# Patient Record
Sex: MALE | Race: WHITE
[De-identification: names, ages, dates, MRNs, and addresses within clinical notes are randomized per-mention and may not be internally consistent; named-entity substitution may affect disease eponyms.]

---

## 2017-03-09 ENCOUNTER — HOSPITAL ENCOUNTER (INPATIENT)
Dept: HOSPITAL 25 - ED | Age: 82
LOS: 2 days | Discharge: HOME | DRG: 249 | End: 2017-03-11
Attending: HOSPITALIST | Admitting: HOSPITALIST
Payer: COMMERCIAL

## 2017-03-09 DIAGNOSIS — R65.10: ICD-10-CM

## 2017-03-09 DIAGNOSIS — Z79.899: ICD-10-CM

## 2017-03-09 DIAGNOSIS — F03.90: ICD-10-CM

## 2017-03-09 DIAGNOSIS — A08.4: Primary | ICD-10-CM

## 2017-03-09 DIAGNOSIS — Z98.2: ICD-10-CM

## 2017-03-09 DIAGNOSIS — Z88.8: ICD-10-CM

## 2017-03-09 DIAGNOSIS — I11.9: ICD-10-CM

## 2017-03-09 DIAGNOSIS — Z95.1: ICD-10-CM

## 2017-03-09 DIAGNOSIS — E86.0: ICD-10-CM

## 2017-03-09 DIAGNOSIS — Z87.891: ICD-10-CM

## 2017-03-09 DIAGNOSIS — Z87.820: ICD-10-CM

## 2017-03-09 DIAGNOSIS — H54.7: ICD-10-CM

## 2017-03-09 DIAGNOSIS — E87.2: ICD-10-CM

## 2017-03-09 DIAGNOSIS — K21.9: ICD-10-CM

## 2017-03-09 DIAGNOSIS — N40.0: ICD-10-CM

## 2017-03-09 DIAGNOSIS — I25.810: ICD-10-CM

## 2017-03-09 DIAGNOSIS — E78.5: ICD-10-CM

## 2017-03-09 LAB
ALBUMIN SERPL BCG-MCNC: 3.9 G/DL (ref 3.2–5.2)
ALP SERPL-CCNC: 78 U/L (ref 34–104)
ALT SERPL W P-5'-P-CCNC: 12 U/L (ref 7–52)
ANION GAP SERPL CALC-SCNC: 12 MMOL/L (ref 2–11)
AST SERPL-CCNC: 12 U/L (ref 13–39)
BUN SERPL-MCNC: 26 MG/DL (ref 6–24)
BUN/CREAT SERPL: 31.3 (ref 8–20)
CALCIUM SERPL-MCNC: 9.2 MG/DL (ref 8.6–10.3)
CHLORIDE SERPL-SCNC: 102 MMOL/L (ref 101–111)
GLOBULIN SER CALC-MCNC: 2.5 G/DL (ref 2–4)
GLUCOSE SERPL-MCNC: 175 MG/DL (ref 70–100)
HCO3 SERPL-SCNC: 24 MMOL/L (ref 22–32)
HCT VFR BLD AUTO: 49 % (ref 42–52)
HGB BLD-MCNC: 16.1 G/DL (ref 14–18)
MCH RBC QN AUTO: 29 PG (ref 27–31)
MCHC RBC AUTO-ENTMCNC: 33 G/DL (ref 31–36)
MCV RBC AUTO: 86 FL (ref 80–94)
POTASSIUM SERPL-SCNC: 3.5 MMOL/L (ref 3.5–5)
PROT SERPL-MCNC: 6.4 G/DL (ref 6.4–8.9)
RBC # BLD AUTO: 5.62 10^6/UL (ref 4–5.4)
SODIUM SERPL-SCNC: 138 MMOL/L (ref 133–145)
TROPONIN I SERPL-MCNC: 0.01 NG/ML (ref ?–0.04)
WBC # BLD AUTO: 11.8 10^3/UL (ref 3.5–10.8)

## 2017-03-09 PROCEDURE — 70450 CT HEAD/BRAIN W/O DYE: CPT

## 2017-03-09 PROCEDURE — 81003 URINALYSIS AUTO W/O SCOPE: CPT

## 2017-03-09 PROCEDURE — 80048 BASIC METABOLIC PNL TOTAL CA: CPT

## 2017-03-09 PROCEDURE — 71010: CPT

## 2017-03-09 PROCEDURE — 87040 BLOOD CULTURE FOR BACTERIA: CPT

## 2017-03-09 PROCEDURE — 85025 COMPLETE CBC W/AUTO DIFF WBC: CPT

## 2017-03-09 PROCEDURE — 83605 ASSAY OF LACTIC ACID: CPT

## 2017-03-09 PROCEDURE — 36415 COLL VENOUS BLD VENIPUNCTURE: CPT

## 2017-03-09 PROCEDURE — 80053 COMPREHEN METABOLIC PANEL: CPT

## 2017-03-09 PROCEDURE — 87502 INFLUENZA DNA AMP PROBE: CPT

## 2017-03-09 PROCEDURE — 94760 N-INVAS EAR/PLS OXIMETRY 1: CPT

## 2017-03-09 PROCEDURE — 87641 MR-STAPH DNA AMP PROBE: CPT

## 2017-03-09 PROCEDURE — 84484 ASSAY OF TROPONIN QUANT: CPT

## 2017-03-09 NOTE — RAD
INDICATION: Fever     



COMPARISON: December 12, 2016

 

TECHNIQUE: An AP portable view obtained at 2009 hours is submitted.



FINDINGS: 



Bones/Soft Tissues: There are no acute bony findings. There is sternotomy



Cardiomediastinal: The cardiomediastinal silhouette is normal. 



Lungs: There are no infiltrates.



Pleura: There are no pleural effusions. 



Other: None



IMPRESSION:  NO ACTIVE DISEASE.

## 2017-03-09 NOTE — HP
H&P (Free Text)


History and Physical: 





PCP: NURYS Leone MD





Date/Time of Evaluation: 03/09/2017 2310





CC: fever, N/V/D <24h





HPI: Mr Dai is an 81YO male resident of [facility] who presents with 

report of N/V/D x <24hours with temperature up to 100F. He has been more 

fatigued than typical. There has been no report of pain, cough, SOB, or other 

issues. Mr Dai is non-verbal, but recognizes his wife at the bedside with whom 

he is very affectionate, reaching up and gently pulling her to him to kiss.





Work up is notable for being SIRS positive initially with tachycardia & 

tachypnea. Labs & vitals are stable. No source is identified. He has been noted 

to have decreased PO intake in general, worse over the past few days and so 

dehydration may be the issue.





PMedHx


CAD/6vCABG


ICH


dementia


HTN


HLD


GERD


TBI


BPH





Allergies


Haloperidol [From Haldol] Allergy (Verified 04/09/15 10:56)


 Unknown Reaction Details





Ambulatory Orders


Nursing to reconcile.





PSurgHx


6vCABG


 shunt


R TKA


tonsillectomy





SocHx: former smoker quit ~20 years ago, no alcohol or recreational drugs; 

resides at [facility] [place]; full code status





FamHx: unobtainable





ROS: as above, otherwise reviewed and all were negative





Constitutional: NAD, normally developed, obese white male


 


vitals: 


 Vital Signs











Temp  37.2 C   03/09/17 23:59


 


Pulse  97   03/09/17 23:59


 


Resp  20   03/09/17 23:59


 


BP  130/67   03/09/17 23:59


 


Pulse Ox  93   03/09/17 23:59








 Intake & Output











 03/09/17 03/09/17 03/10/17





 11:59 23:59 11:59


 


Weight  108.862 kg 








HEENM: atraumatic; sclera/conjunctiva: non-icteric/clear; hearing: unable to 

adequately assess; oropharynx: clear, mucosa tacky





Neck: soft tissue: no nuchal rigidity; thyroid: normal 





Pulmonary: clear to auscultation bilaterally, good aeration, no accessory 

muscle use





CV: RR/RR, normal S1S2, no carotid bruit, no jugular venous distention, 2+ B DP/

PT, no edema





Abdominal: soft, non-distended, non-tender, no rebound/guarding/rigidity, 

normoactive bowel sounds, no hepatosplenomegaly or masses, no costovertebral 

angle tenderness





Musculoskeletal: general: grossly intact; gait: minimally ambulatory, uses 

walker





Integumental: normal appearance & texture of exposed skin





Psychiatric 


orientation: AA&O to person only


affect: calm


mood: cooperative


eye contact: fair


content: non-verbal


insight: poor





Testing: 


 Lab Results











  03/09/17 03/09/17 03/09/17 Range/Units





  21:10 21:10 21:10 


 


WBC  11.8 H    (3.5-10.8)  10^3/ul


 


RBC  5.62 H    (4.0-5.4)  10^6/ul


 


Hgb  16.1    (14.0-18.0)  g/dl


 


Hct  49    (42-52)  %


 


MCV  86    (80-94)  fL


 


MCH  29    (27-31)  pg


 


MCHC  33    (31-36)  g/dl


 


RDW  13    (10.5-15)  %


 


Plt Count  187    (150-450)  10^3/ul


 


MPV  8    (7.4-10.4)  um3


 


Neut % (Auto)  88.5 H    (38-83)  %


 


Lymph % (Auto)  7.4 L    (25-47)  %


 


Mono % (Auto)  3.6    (1-9)  %


 


Eos % (Auto)  0.2    (0-6)  %


 


Baso % (Auto)  0.3    (0-2)  %


 


Absolute Neuts (auto)  10.4 H    (1.5-7.7)  10^3/ul


 


Absolute Lymphs (auto)  0.9 L    (1.0-4.8)  10^3/ul


 


Absolute Monos (auto)  0.4    (0-0.8)  10^3/ul


 


Absolute Eos (auto)  0    (0-0.6)  10^3/ul


 


Absolute Basos (auto)  0    (0-0.2)  10^3/ul


 


Absolute Nucleated RBC  0    10^3/ul


 


Nucleated RBC %  0    


 


Sodium   138   (133-145)  mmol/L


 


Potassium   3.5   (3.5-5.0)  mmol/L


 


Chloride   102   (101-111)  mmol/L


 


Carbon Dioxide   24   (22-32)  mmol/L


 


Anion Gap   12 H   (2-11)  mmol/L


 


BUN   26 H   (6-24)  mg/dL


 


Creatinine   0.83   (0.67-1.17)  mg/dL


 


Est GFR ( Amer)   114.4   (>60)  


 


Est GFR (Non-Af Amer)   88.9   (>60)  


 


BUN/Creatinine Ratio   31.3 H   (8-20)  


 


Glucose   175 H   ()  mg/dL


 


Lactic Acid    2.7 H*  (0.5-2.0)  mmol/L


 


Calcium   9.2   (8.6-10.3)  mg/dL


 


Total Bilirubin   0.90   (0.2-1.0)  mg/dL


 


AST   12 L   (13-39)  U/L


 


ALT   12   (7-52)  U/L


 


Alkaline Phosphatase   78   ()  U/L


 


Troponin I   0.01   (<0.04)  ng/mL


 


Total Protein   6.4   (6.4-8.9)  g/dL


 


Albumin   3.9   (3.2-5.2)  g/dL


 


Globulin   2.5   (2-4)  g/dL


 


Albumin/Globulin Ratio   1.6   (1-3)  


 


Urine Color     


 


Urine Appearance     


 


Urine pH     (5-9)  


 


Ur Specific Gravity     (1.010-1.030)  


 


Urine Protein     (Negative)  


 


Urine Ketones     (Negative)  


 


Urine Blood     (Negative)  


 


Urine Nitrate     (Negative)  


 


Urine Bilirubin     (Negative)  


 


Urine Urobilinogen     (Negative)  


 


Ur Leukocyte Esterase     (Negative)  


 


Urine Glucose     (Negative)  


 


Urine Ascorbic Acid     (Negative)  


 


Influenza A (Rapid)     (Negative)  


 


Influenza B (Rapid)     (Negative)  














  03/09/17 03/09/17 Range/Units





  21:33 23:13 


 


WBC    (3.5-10.8)  10^3/ul


 


RBC    (4.0-5.4)  10^6/ul


 


Hgb    (14.0-18.0)  g/dl


 


Hct    (42-52)  %


 


MCV    (80-94)  fL


 


MCH    (27-31)  pg


 


MCHC    (31-36)  g/dl


 


RDW    (10.5-15)  %


 


Plt Count    (150-450)  10^3/ul


 


MPV    (7.4-10.4)  um3


 


Neut % (Auto)    (38-83)  %


 


Lymph % (Auto)    (25-47)  %


 


Mono % (Auto)    (1-9)  %


 


Eos % (Auto)    (0-6)  %


 


Baso % (Auto)    (0-2)  %


 


Absolute Neuts (auto)    (1.5-7.7)  10^3/ul


 


Absolute Lymphs (auto)    (1.0-4.8)  10^3/ul


 


Absolute Monos (auto)    (0-0.8)  10^3/ul


 


Absolute Eos (auto)    (0-0.6)  10^3/ul


 


Absolute Basos (auto)    (0-0.2)  10^3/ul


 


Absolute Nucleated RBC    10^3/ul


 


Nucleated RBC %    


 


Sodium    (133-145)  mmol/L


 


Potassium    (3.5-5.0)  mmol/L


 


Chloride    (101-111)  mmol/L


 


Carbon Dioxide    (22-32)  mmol/L


 


Anion Gap    (2-11)  mmol/L


 


BUN    (6-24)  mg/dL


 


Creatinine    (0.67-1.17)  mg/dL


 


Est GFR ( Amer)    (>60)  


 


Est GFR (Non-Af Amer)    (>60)  


 


BUN/Creatinine Ratio    (8-20)  


 


Glucose    ()  mg/dL


 


Lactic Acid    (0.5-2.0)  mmol/L


 


Calcium    (8.6-10.3)  mg/dL


 


Total Bilirubin    (0.2-1.0)  mg/dL


 


AST    (13-39)  U/L


 


ALT    (7-52)  U/L


 


Alkaline Phosphatase    ()  U/L


 


Troponin I    (<0.04)  ng/mL


 


Total Protein    (6.4-8.9)  g/dL


 


Albumin    (3.2-5.2)  g/dL


 


Globulin    (2-4)  g/dL


 


Albumin/Globulin Ratio    (1-3)  


 


Urine Color  Yellow   


 


Urine Appearance  Cloudy   


 


Urine pH  5.0   (5-9)  


 


Ur Specific Gravity  1.027   (1.010-1.030)  


 


Urine Protein  Negative   (Negative)  


 


Urine Ketones  Trace H   (Negative)  


 


Urine Blood  Negative   (Negative)  


 


Urine Nitrate  Negative   (Negative)  


 


Urine Bilirubin  Negative   (Negative)  


 


Urine Urobilinogen  Negative   (Negative)  


 


Ur Leukocyte Esterase  Negative   (Negative)  


 


Urine Glucose  Negative   (Negative)  


 


Urine Ascorbic Acid  * H   (Negative)  


 


Influenza A (Rapid)   Negative  (Negative)  


 


Influenza B (Rapid)   Negative  (Negative)  








CXR, personally reviewed: IMPRESSION:  NO ACTIVE DISEASE.





Impression: 82M presenting with probable viral gastroenteritis and SIRS 2nd 

dehydration





DIAGNOSIS & PLAN


Primary 


SIRS 2nd dehydration & suspected viral gastroenteritis


: IVFs


: trend labs


: supportive care





Secondary 


CAD/6vCABG


: review meds once reconciled





ICH


: no anticoagulation for DVT prophylaxis





dementia


: review meds once reconciled





HTN


: review meds once reconciled


: PRN IV hydralazine for systolics >175





HLD


: review meds once reconciled





GERD


: omeprazole





BPH


: review meds once reconciled





Admission Rational: observation for IVFs & dehydration


DVTp: SCDs


Code Status: full


HCP: wife

## 2017-03-09 NOTE — ED
Dani ARZOLA Adam, scribed for Ry Brand on 03/09/17 at 1959 .





GI/ HPI





- HPI Summary


HPI Summary: 


Pt is an 82 year old male presenting with vomiting and diarrhea. He has 

dementia and is completely nonverbal during exam; hx was obtained from pt's 

wife. She states that the staff at [facility] report vomiting 

and diarrhea and have been unable to get the pt to eat or drink anything. They 

also report that the pt has a fever. In addition to dementia, PMHx includes 

subarachnoid hemorrhage (x8 years), CABGx5, HTN, GERD, and right TKR. Pt is not 

on home o2.   








- History of Current Complaint


Chief Complaint: EDGeneral


Time Seen by Provider: 03/09/17 19:49


Stated Complaint: GENERAL ILLNESS


Hx Obtained From: Family/Caretaker - Wife


Hx From Patient Unobtainable Due To: Dementia - Level 5 Caveat


Onset/Duration: Started Hours Ago, Atraumatic, Still Present


Timing: Constant


Severity: Moderate


Current Severity: Moderate


Associated Signs and Symptoms: Positive: Vomiting, Diarrhea, Fever, Change in 

Appetite - Decreased


Aggravating Factor(s): Nothing


Alleviating Factor(s): Nothing





- Additional Pertinent History


Primary Care Physician: [id number]





- Allergy/Home Medications


Allergies/Adverse Reactions: 


 Allergies











Allergy/AdvReac Type Severity Reaction Status Date / Time


 


Haloperidol [From Haldol] Allergy  Unknown Verified 04/09/15 10:56





   Reaction  





   Details  














PMH/Surg Hx/FS Hx/Imm Hx


Cardiovascular History: Reports: Hx Angina - NONE SINCE 2003, Hx Coronary 

Artery Disease - CABG X5 2003, Hx Hypertension - HISTORY- NO PROBLEMS NOW, 

Other Cardiovascular Problems/Disorders - CABG


   Denies: Hx Pacemaker/ICD


GI History: Reports: Hx Gastroesophageal Reflux Disease - WELL CONTROLLED


Sensory History: Reports: Hx Contacts or Glasses - GLASSES, Hx Hearing Aid - 

RIGHT EAR RARELY USES


Opthamlomology History: Reports: Hx Contacts or Glasses - GLASSES


Neurological History: Reports: Hx Dementia, Other Neuro Impairments/Disorders - 

DEMENTIA, subarachnoid hemorrhage





- Surgical History


Surgery Procedure, Year, and Place: shunt-2008- [facility].  RIGHT TKR 2006 [facility].  

CABG X5 2003 [place].  TONSILLECTOMY AS A CHILD


Hx Anesthesia Reactions: No


Infectious Disease History: 


   Denies: Traveled Outside the [place] in Last 30 Days





- Family History


Known Family History: Positive: Unknown - LEVEL 5, unable to obtain, Other - 

Negative: malignant hyperthermia, anesthesia reaction





- Social History


Occupation: Retired


[facility]: [facility]


Alcohol Use: None


Hx Substance Use: No


Substance Use Type: Reports: None


Hx Tobacco Use: Yes


Smoking Status (MU): Former Smoker - stopped in 2000


Amount Used/How Often: 1/2 PPD X 30 YEARS


Have You Smoked in the Last Year: No





Review of Systems


Positive: Fever


Positive: Vomiting, Diarrhea, Other - Decreased PO intake


All Other Systems Reviewed And Are Negative: No





- Comments


Additional Review of Systems Comments: 


Level 5 Caveat due to dementia








Physical Exam





- Summary


Physical Exam Summary: 


Unable to complete exam because of dementia





Triage Information Reviewed: Yes


Vital Signs On Initial Exam: 


 Initial Vitals











Temp Pulse Resp BP Pulse Ox


 


 100 F   115   26   147/77   94 


 


 03/09/17 19:50  03/09/17 19:50  03/09/17 19:50  03/09/17 19:50  03/09/17 19:50











Vital Signs Reviewed: Yes


Completion Of Physical Exam Limited Due To: Level 5 - Level 5 Caveat due to 

dementia


Appearance: Positive: Well-Appearing, No Pain Distress


Skin: Positive: Warm, Skin Color Reflects Adequate Perfusion, Dry


Head/Face: Positive: Normal Head/Face Inspection


Eyes: Positive: EOMI, JAMES


ENT: Positive: Other - Dry mucous membranes


Neck: Positive: Supple


Neurological: Positive: Other - Dementia


Psychiatric: Positive: Patient Uncooperative for Exam - Due to dementia





Diagnostics





- Vital Signs


 Vital Signs











  Temp Pulse Resp BP Pulse Ox


 


 03/09/17 21:18   98  16  119/48  92


 


 03/09/17 19:50  100 F  115  26  147/77  94














- Laboratory


Lab Results: 


 Lab Results











  03/09/17 03/09/17 03/09/17 Range/Units





  21:10 21:10 21:10 


 


WBC  11.8 H    (3.5-10.8)  10^3/ul


 


RBC  5.62 H    (4.0-5.4)  10^6/ul


 


Hgb  16.1    (14.0-18.0)  g/dl


 


Hct  49    (42-52)  %


 


MCV  86    (80-94)  fL


 


MCH  29    (27-31)  pg


 


MCHC  33    (31-36)  g/dl


 


RDW  13    (10.5-15)  %


 


Plt Count  187    (150-450)  10^3/ul


 


MPV  8    (7.4-10.4)  um3


 


Neut % (Auto)  88.5 H    (38-83)  %


 


Lymph % (Auto)  7.4 L    (25-47)  %


 


Mono % (Auto)  3.6    (1-9)  %


 


Eos % (Auto)  0.2    (0-6)  %


 


Baso % (Auto)  0.3    (0-2)  %


 


Absolute Neuts (auto)  10.4 H    (1.5-7.7)  10^3/ul


 


Absolute Lymphs (auto)  0.9 L    (1.0-4.8)  10^3/ul


 


Absolute Monos (auto)  0.4    (0-0.8)  10^3/ul


 


Absolute Eos (auto)  0    (0-0.6)  10^3/ul


 


Absolute Basos (auto)  0    (0-0.2)  10^3/ul


 


Absolute Nucleated RBC  0    10^3/ul


 


Nucleated RBC %  0    


 


Sodium   138   (133-145)  mmol/L


 


Potassium   3.5   (3.5-5.0)  mmol/L


 


Chloride   102   (101-111)  mmol/L


 


Carbon Dioxide   24   (22-32)  mmol/L


 


Anion Gap   12 H   (2-11)  mmol/L


 


BUN   26 H   (6-24)  mg/dL


 


Creatinine   0.83   (0.67-1.17)  mg/dL


 


Est GFR ( Amer)   114.4   (>60)  


 


Est GFR (Non-Af Amer)   88.9   (>60)  


 


BUN/Creatinine Ratio   31.3 H   (8-20)  


 


Glucose   175 H   ()  mg/dL


 


Lactic Acid    2.7 H*  (0.5-2.0)  mmol/L


 


Calcium   9.2   (8.6-10.3)  mg/dL


 


Total Bilirubin   0.90   (0.2-1.0)  mg/dL


 


AST   12 L   (13-39)  U/L


 


ALT   12   (7-52)  U/L


 


Alkaline Phosphatase   78   ()  U/L


 


Troponin I   0.01   (<0.04)  ng/mL


 


Total Protein   6.4   (6.4-8.9)  g/dL


 


Albumin   3.9   (3.2-5.2)  g/dL


 


Globulin   2.5   (2-4)  g/dL


 


Albumin/Globulin Ratio   1.6   (1-3)  


 


Urine Color     


 


Urine Appearance     


 


Urine pH     (5-9)  


 


Ur Specific Gravity     (1.010-1.030)  


 


Urine Protein     (Negative)  


 


Urine Ketones     (Negative)  


 


Urine Blood     (Negative)  


 


Urine Nitrate     (Negative)  


 


Urine Bilirubin     (Negative)  


 


Urine Urobilinogen     (Negative)  


 


Ur Leukocyte Esterase     (Negative)  


 


Urine Glucose     (Negative)  


 


Urine Ascorbic Acid     (Negative)  














  03/09/17 Range/Units





  21:33 


 


WBC   (3.5-10.8)  10^3/ul


 


RBC   (4.0-5.4)  10^6/ul


 


Hgb   (14.0-18.0)  g/dl


 


Hct   (42-52)  %


 


MCV   (80-94)  fL


 


MCH   (27-31)  pg


 


MCHC   (31-36)  g/dl


 


RDW   (10.5-15)  %


 


Plt Count   (150-450)  10^3/ul


 


MPV   (7.4-10.4)  um3


 


Neut % (Auto)   (38-83)  %


 


Lymph % (Auto)   (25-47)  %


 


Mono % (Auto)   (1-9)  %


 


Eos % (Auto)   (0-6)  %


 


Baso % (Auto)   (0-2)  %


 


Absolute Neuts (auto)   (1.5-7.7)  10^3/ul


 


Absolute Lymphs (auto)   (1.0-4.8)  10^3/ul


 


Absolute Monos (auto)   (0-0.8)  10^3/ul


 


Absolute Eos (auto)   (0-0.6)  10^3/ul


 


Absolute Basos (auto)   (0-0.2)  10^3/ul


 


Absolute Nucleated RBC   10^3/ul


 


Nucleated RBC %   


 


Sodium   (133-145)  mmol/L


 


Potassium   (3.5-5.0)  mmol/L


 


Chloride   (101-111)  mmol/L


 


Carbon Dioxide   (22-32)  mmol/L


 


Anion Gap   (2-11)  mmol/L


 


BUN   (6-24)  mg/dL


 


Creatinine   (0.67-1.17)  mg/dL


 


Est GFR ( Amer)   (>60)  


 


Est GFR (Non-Af Amer)   (>60)  


 


BUN/Creatinine Ratio   (8-20)  


 


Glucose   ()  mg/dL


 


Lactic Acid   (0.5-2.0)  mmol/L


 


Calcium   (8.6-10.3)  mg/dL


 


Total Bilirubin   (0.2-1.0)  mg/dL


 


AST   (13-39)  U/L


 


ALT   (7-52)  U/L


 


Alkaline Phosphatase   ()  U/L


 


Troponin I   (<0.04)  ng/mL


 


Total Protein   (6.4-8.9)  g/dL


 


Albumin   (3.2-5.2)  g/dL


 


Globulin   (2-4)  g/dL


 


Albumin/Globulin Ratio   (1-3)  


 


Urine Color  Yellow  


 


Urine Appearance  Cloudy  


 


Urine pH  5.0  (5-9)  


 


Ur Specific Gravity  1.027  (1.010-1.030)  


 


Urine Protein  Negative  (Negative)  


 


Urine Ketones  Trace H  (Negative)  


 


Urine Blood  Negative  (Negative)  


 


Urine Nitrate  Negative  (Negative)  


 


Urine Bilirubin  Negative  (Negative)  


 


Urine Urobilinogen  Negative  (Negative)  


 


Ur Leukocyte Esterase  Negative  (Negative)  


 


Urine Glucose  Negative  (Negative)  


 


Urine Ascorbic Acid  * H  (Negative)  











Result Diagrams: 


 03/09/17 21:10





 03/09/17 21:10


Lab Statement: Any lab studies that have been ordered have been reviewed, and 

results considered in the medical decision making process.





- Radiology


  ** CXR


Radiology Interpretation Completed By: Radiologist - IMPRESSION: NO ACTIVE 

DISEASE.





- Additional Comments


Diagnostic Additional Comments: 


Lactic Acid - 2.7





Troponin I - 0.01








GIGU Course/Dx





- Course


Course Of Treatment: 22:28 - Patient will be admitted. Discussed with Dr. Frankenberg.





- Diagnoses


Provider Diagnoses: 


 Sepsis, Dementia








Discharge





- Discharge Plan


Condition: Stable


Disposition: ADMITTED TO [facility]


Referrals: 


Darion Leone MD [Primary Care Provider] - 





The documentation as recorded by the Dani castor Adam accurately reflects 

the service I personally performed and the decisions made by Cathie loco Emmanuel.

## 2017-03-10 LAB
ANION GAP SERPL CALC-SCNC: 5 MMOL/L (ref 2–11)
BUN SERPL-MCNC: 26 MG/DL (ref 6–24)
BUN/CREAT SERPL: 31 (ref 8–20)
CALCIUM SERPL-MCNC: 8.3 MG/DL (ref 8.6–10.3)
CHLORIDE SERPL-SCNC: 106 MMOL/L (ref 101–111)
GLUCOSE SERPL-MCNC: 118 MG/DL (ref 70–100)
HCO3 SERPL-SCNC: 26 MMOL/L (ref 22–32)
HCT VFR BLD AUTO: 41 % (ref 42–52)
HGB BLD-MCNC: 14 G/DL (ref 14–18)
MCH RBC QN AUTO: 30 PG (ref 27–31)
MCHC RBC AUTO-ENTMCNC: 34 G/DL (ref 31–36)
MCV RBC AUTO: 86 FL (ref 80–94)
POTASSIUM SERPL-SCNC: 3.5 MMOL/L (ref 3.5–5)
RBC # BLD AUTO: 4.75 10^6/UL (ref 4–5.4)
SODIUM SERPL-SCNC: 137 MMOL/L (ref 133–145)
WBC # BLD AUTO: 9.1 10^3/UL (ref 3.5–10.8)

## 2017-03-10 RX ADMIN — SODIUM CHLORIDE SCH MLS/HR: 900 IRRIGANT IRRIGATION at 16:26

## 2017-03-10 RX ADMIN — Medication SCH APPLIC: at 21:00

## 2017-03-10 RX ADMIN — SODIUM CHLORIDE SCH MLS/HR: 900 IRRIGANT IRRIGATION at 02:00

## 2017-03-10 RX ADMIN — OMEPRAZOLE SCH MG: 20 CAPSULE, DELAYED RELEASE ORAL at 06:25

## 2017-03-10 NOTE — PN
Subjective


Date of Service: 03/10/17


Interval History: 





Nursing reported this AM that patient was running into objects while 

ambulating. On further exam seems vision is impaired or absent from L eye. 


Patient evaluated by me. He has no complaints. Confused, keeps answers to one 

word. Denies abdominal pain, says he is not hungry. Unclear if he has eaten 

anything. Cannot recall why he is here. 


Family History: Unchanged from Admission


Social History: Unchanged from Admission


Past Medical History: Unchanged from Admission





Objective


Active Medications: 








Acetaminophen (Tylenol Tab*)  650 mg PO Q6H PRN


Albuterol (Ventolin 2.5 Mg/3 Ml Neb.Sol*)  2.5 mg INH Q2H PRN


Sodium Chloride (Ns 0.9% 1000 Ml*)  1,000 mls @ 125 mls/hr IV PER RATE GEORGE


Melatonin (Melatonin (Nf))  3 mg PO BEDTIME PRN; Protocol


Omeprazole (Prilosec Cap*)  20 mg PO DAILY@0600 GEORGE


Ondansetron HCl (Zofran Inj*)  4 mg IV Q6H PRN





 Vital Signs











  03/09/17 03/10/17 03/10/17





  23:59 04:17 04:49


 


Temperature 98.9 F 98.0 F 98.8 F


 


Pulse Rate 97 73 83


 


Respiratory 20 20 18





Rate   


 


Blood Pressure 130/67 130/51 130/59





(mmHg)   


 


O2 Sat by Pulse 93 93 92





Oximetry   














  03/10/17 03/10/17 03/10/17





  04:51 07:54 10:35


 


Temperature  98.3 F 


 


Pulse Rate 75 79 78


 


Respiratory 12 18 18





Rate   


 


Blood Pressure  109/37 





(mmHg)   


 


O2 Sat by Pulse 94 92 97





Oximetry   














  03/10/17





  11:14


 


Temperature 


 


Pulse Rate 81


 


Respiratory 





Rate 


 


Blood Pressure 121/48





(mmHg) 


 


O2 Sat by Pulse 96





Oximetry 











Oxygen Devices in Use Now: None


Appearance: Elderly,  M, laying in bed in NAD


Eyes: No Scleral Icterus


Ears/Nose/Mouth/Throat: Mucous Membranes Moist


Neck: NL Appearance and Movements; NL JVP


Respiratory: Symmetrical Chest Expansion and Respiratory Effort, Clear to 

Auscultation


Cardiovascular: NL Sounds; No Murmurs; No JVD, RRR


Abdominal: NL Sounds; No Tenderness; No Distention


Lymphatic: No Cervical Adenopathy


Extremities: No Edema


Skin: No Rash or Ulcers


Neurological: - - Alert, oriented to self and "hospital", when asked the year 

he stated Arnulfo, neuro exam difficult due to dementia and limited cooperation, 

has diminished vision in L eye,  no other clear neuro deficits


Result Diagrams: 


 03/10/17 04:40





 03/10/17 04:40


Additional Lab and Data: 


 





Microbiology and Other Data: 


 Microbiology











 03/10/17 03:10 Nasal Screen MRSA (PCR)(REID) - Final





 Nasal    Mrsa Negative














Assess/Plan/Problems-Billing


Assessment: 


Viral gastroenteritis in an 83 yo M with hx of CAD, dementia, ICH, HTN, HLD, TBI








- Patient Problems


(1) Viral gastroenteritis


Current Visit: Yes   Comment: Stool seems to have slowed. Patient denies nausea 

but has not eaten anything. Will continue light IVF for now. No indication for 

ABx at this point.    





(2) Vision disturbance


Current Visit: Yes   Comment: Patients wife says he has poor vision and usually 

uses glasses but loses them frequently. Unclear if this L sided vision 

abnormality is new. Will get CT head with hx of ICH.    





(3) CAD (coronary artery disease)


Current Visit: Yes   Comment: Continue statin   





(4) HTN (hypertension)


Current Visit: Yes   Comment: Holding HCTZ   





(5) Dementia


Current Visit: Yes   Comment: Continue qhs trazodone   





(6) DVT prophylaxis


Current Visit: Yes   Comment: SCDs

## 2017-03-10 NOTE — RAD
HISTORY: Visual defect



COMPARISONS: September 12, 2016



TECHNIQUE: Multiple contiguous axial CT scans were obtained of the head without 

intravenous contrast. 



FINDINGS: 

HEMORRHAGE/INFARCT: There is no hemorrhage or acute infarct.

MASSES/SHIFT: There is no mass or shift.



EXTRA-AXIAL SPACES: There are no extra-axial fluid collections.

SULCI AND VENTRICLES: Again noted is a ventricular catheter from a right frontal

craniotomy approach. There is mild ventricular megaly, stable.



CEREBRUM: There are no focal parenchymal abnormalities.

BRAINSTEM: There are no focal parenchymal abnormalities.

CEREBELLUM: There are no focal parenchymal abnormalities.



VESSELS: The vessels are grossly normal.

PARANASAL SINUSES: The paranasal sinuses are clear.

ORBITS: The orbits are unremarkable.

BONES AND SOFT TISSUE: No bone or soft tissue abnormalities are noted.



OTHER: None



IMPRESSION: 

1.  NO ACUTE INTRACRANIAL PATHOLOGY.

2.  STABLE MILD VENTRICULOMEGALY WITH A RIGHT-SIDED VENTRICULAR DRAINAGE CATHETER

## 2017-03-11 VITALS — DIASTOLIC BLOOD PRESSURE: 34 MMHG | SYSTOLIC BLOOD PRESSURE: 121 MMHG

## 2017-03-11 LAB
ANION GAP SERPL CALC-SCNC: 4 MMOL/L (ref 2–11)
BUN SERPL-MCNC: 17 MG/DL (ref 6–24)
BUN/CREAT SERPL: 27.4 (ref 8–20)
CALCIUM SERPL-MCNC: 7.6 MG/DL (ref 8.6–10.3)
CHLORIDE SERPL-SCNC: 107 MMOL/L (ref 101–111)
GLUCOSE SERPL-MCNC: 96 MG/DL (ref 70–100)
HCO3 SERPL-SCNC: 25 MMOL/L (ref 22–32)
POTASSIUM SERPL-SCNC: 3.2 MMOL/L (ref 3.5–5)
SODIUM SERPL-SCNC: 136 MMOL/L (ref 133–145)

## 2017-03-11 RX ADMIN — SODIUM CHLORIDE SCH MLS/HR: 900 IRRIGANT IRRIGATION at 02:07

## 2017-03-11 RX ADMIN — Medication SCH APPLIC: at 10:00

## 2017-03-11 RX ADMIN — OMEPRAZOLE SCH MG: 20 CAPSULE, DELAYED RELEASE ORAL at 05:19

## 2017-03-11 NOTE — DCNOTE
Patient seen this morning. Ate dinner last night. Awaiting breakfast. No N/V. 

Had solid stool this AM. No complaints. 


On exam, RRR, s1 and s2 present, no m/g/r, abd soft, NTND, BS+, confused


Plan to d/c back to [facility].

## 2017-03-12 NOTE — DS
DISCHARGE SUMMARY:

 

DATE OF ADMISSION:  03/09/17

 

DATE OF DISCHARGE:  03/11/17

 

PRIMARY CARE PROVIDER:  Dr. Leone.

 

PRINCIPAL DISCHARGE DIAGNOSES:

1.  Viral gastroenteritis.

2.  Mild lactic acidosis.

 

SECONDARY DIAGNOSES:

1.  Hypertension.

2.  Coronary artery disease.

3.  Intracranial hemorrhage.

4.  Dementia.

5.  Traumatic brain injury.

6.  Benign prostatic hyperplasia.

7.  Gastroesophageal reflux disease.

8.  Hyperlipidemia.

 

DISCHARGE MEDICATION REGIMEN:

1.  Trazodone 100 mg by mouth at bedtime.

2.  Guaifenesin 600 mg by mouth 2 times daily as needed for cough.

3.  Hydrocortisone 2.5% one application topical 2 times daily.

4.  Flomax 0.4 mg by mouth daily.

5.  Atorvastatin 20 mg by mouth nightly.

6.  Hydrochlorothiazide 12.5 mg by mouth daily.

7.  Emollient one application topical 2 times daily.

8.  Tylenol 500 mg by mouth every 4 hours as needed for pain.

 

STUDIES DONE DURING HOSPITALIZATION:

1.  Chest x-ray, impression:  No active disease.

2.  CT of the brain without contrast, impression:  No acute intracranial 
pathology, stable.  Mild ventriculomegaly with right-sided ventricular drainage 
catheter.

 

HISTORY OF PRESENT ILLNESS AND HOSPITAL SUMMARY:  Please see the full history 
and physical done by Dr. Fred Frankenberg for full details.  Briefly, Mr. Dai 
is an 82- year-old male with a past medical history as above, who presented to 
the hospital with nausea and vomiting from his assisted living facility.  It 
seems that there have been other residents at the assisted living facility with 
similar problems. The patient was treated supportively with IV fluids.  He had 
a mild lactic acidosis that resolved.  The following day, his nausea, vomiting, 
and diarrhea improved; however, the patient was taking very little p.o. intake.
  He was continued on IV fluids for another 24 hours and his appetite picked 
up.  His stools became formed. The patient was noted to have some what seems to 
be visual impairment in the left eye.  The wife states he has poor vision.  He 
often forgets his glasses.  It was difficult to get an accurate examination 
from the patient due to his dementia.  A CT scan was done to ensure that there 
was no new evidence of strokes or intracranial hemorrhage.  This was negative.  
The patient will be discharged back to [facility].  I spoke with the patient'
s wife who is aware and agreeable.

 

TIME SPENT:  Total time spent on this discharge 35 minutes.  This is a summary 
of the hospitalization, please see the full medical record for further details.



CC:  Dr. Leone *

 

 [id number] #: [id number]

ROSA ISELA

## 2017-06-19 ENCOUNTER — HOSPITAL ENCOUNTER (EMERGENCY)
Dept: HOSPITAL 25 - ED | Age: 82
Discharge: HOME | End: 2017-06-19
Payer: COMMERCIAL

## 2017-06-19 VITALS — SYSTOLIC BLOOD PRESSURE: 133 MMHG | DIASTOLIC BLOOD PRESSURE: 61 MMHG

## 2017-06-19 DIAGNOSIS — R34: ICD-10-CM

## 2017-06-19 DIAGNOSIS — K21.9: ICD-10-CM

## 2017-06-19 DIAGNOSIS — Z86.73: ICD-10-CM

## 2017-06-19 DIAGNOSIS — Z87.891: ICD-10-CM

## 2017-06-19 DIAGNOSIS — R55: Primary | ICD-10-CM

## 2017-06-19 DIAGNOSIS — Z87.440: ICD-10-CM

## 2017-06-19 DIAGNOSIS — I10: ICD-10-CM

## 2017-06-19 DIAGNOSIS — I25.119: ICD-10-CM

## 2017-06-19 DIAGNOSIS — Z95.1: ICD-10-CM

## 2017-06-19 LAB
ALBUMIN SERPL BCG-MCNC: 3.5 G/DL (ref 3.2–5.2)
ALP SERPL-CCNC: 86 U/L (ref 34–104)
ALT SERPL W P-5'-P-CCNC: 14 U/L (ref 7–52)
ANION GAP SERPL CALC-SCNC: 6 MMOL/L (ref 2–11)
AST SERPL-CCNC: 14 U/L (ref 13–39)
BUN SERPL-MCNC: 16 MG/DL (ref 6–24)
BUN/CREAT SERPL: 18 (ref 8–20)
CALCIUM SERPL-MCNC: 8.8 MG/DL (ref 8.6–10.3)
CHLORIDE SERPL-SCNC: 100 MMOL/L (ref 101–111)
GLOBULIN SER CALC-MCNC: 2.6 G/DL (ref 2–4)
GLUCOSE SERPL-MCNC: 168 MG/DL (ref 70–100)
HCO3 SERPL-SCNC: 25 MMOL/L (ref 22–32)
HCT VFR BLD AUTO: 43 % (ref 42–52)
HGB BLD-MCNC: 14.5 G/DL (ref 14–18)
MAGNESIUM SERPL-MCNC: 1.9 MG/DL (ref 1.9–2.7)
MCH RBC QN AUTO: 29 PG (ref 27–31)
MCHC RBC AUTO-ENTMCNC: 34 G/DL (ref 31–36)
MCV RBC AUTO: 86 FL (ref 80–94)
POTASSIUM SERPL-SCNC: 3.4 MMOL/L (ref 3.5–5)
PROT SERPL-MCNC: 6.1 G/DL (ref 6.4–8.9)
RBC # BLD AUTO: 5 10^6/UL (ref 4–5.4)
SODIUM SERPL-SCNC: 131 MMOL/L (ref 133–145)
TROPONIN I SERPL-MCNC: 0.03 NG/ML (ref ?–0.04)
TSH SERPL-ACNC: 1 MCIU/ML (ref 0.34–5.6)
WBC # BLD AUTO: 11.7 10^3/UL (ref 3.5–10.8)

## 2017-06-19 PROCEDURE — 70450 CT HEAD/BRAIN W/O DYE: CPT

## 2017-06-19 PROCEDURE — 85025 COMPLETE CBC W/AUTO DIFF WBC: CPT

## 2017-06-19 PROCEDURE — 80053 COMPREHEN METABOLIC PANEL: CPT

## 2017-06-19 PROCEDURE — 83735 ASSAY OF MAGNESIUM: CPT

## 2017-06-19 PROCEDURE — 85610 PROTHROMBIN TIME: CPT

## 2017-06-19 PROCEDURE — 84443 ASSAY THYROID STIM HORMONE: CPT

## 2017-06-19 PROCEDURE — 93005 ELECTROCARDIOGRAM TRACING: CPT

## 2017-06-19 PROCEDURE — 83605 ASSAY OF LACTIC ACID: CPT

## 2017-06-19 PROCEDURE — 96360 HYDRATION IV INFUSION INIT: CPT

## 2017-06-19 PROCEDURE — 36415 COLL VENOUS BLD VENIPUNCTURE: CPT

## 2017-06-19 PROCEDURE — 84484 ASSAY OF TROPONIN QUANT: CPT

## 2017-06-19 PROCEDURE — 99283 EMERGENCY DEPT VISIT LOW MDM: CPT

## 2017-06-19 PROCEDURE — 71010: CPT

## 2017-06-19 NOTE — RAD
INDICATION: Syncope     



COMPARISON: March 09, 2017

 

TECHNIQUE: An AP portable view obtained at 2026 hours is submitted.



FINDINGS: 



Bones/Soft Tissues: There are no acute bony findings. There is sternotomy/CABG



Cardiomediastinal: The cardiomediastinal silhouette is normal. 



Lungs: There is [minimal left basilar atelectasis or infiltrate. The remaining lung fields

are clear.



Pleura: Minor blunting left costophrenic angle may represent a tiny amount of pleural

fluid.. 



Other: None



IMPRESSION:  MINIMAL LEFT BASILAR ABNORMALITIES WITH TINY PLEURAL EFFUSION

## 2017-06-19 NOTE — ED
Shauna ARZOLA Rebecca, scribed for Kiesha Baires MD on 17 at 2012 .





Complex/Multi-Sys Presentation





- HPI Summary


HPI Summary: 


Patient is an 83 y/o M BIBA who presents to ED accompanied by his wife who 

states that he had en episode of dizziness earlier today.  Sx were noticed at 

his assisted living facility at approximately 1830. Wife additionally reports 

he has had decreased urinary frequency recently. Negative for any pain. Sx 

aggravated and alleviated by nothing. Denies any recent falls. PMHx UTI (4 

years ago) and CVA (8 years ago). Wife reports he is at his baseline of 

cognition currently. 





- History Of Current Complaint


Chief Complaint: EDDizziness


Hx Obtained From: Family/Caretaker - Wife


Onset/Duration: Sudden Onset


Severity Currently: None


Location: Negative


Aggravating Factor(s): Nothing


Alleviating Factor(s): Nothing


Associated Signs And Symptoms: Positive: Dizziness, Other - Decreased urinary 

frequency





- Allergies/Home Medications


Allergies/Adverse Reactions: 


 Allergies











Allergy/AdvReac Type Severity Reaction Status Date / Time


 


Haloperidol [From Haldol] Allergy  Unknown Verified 04/09/15 10:56





   Reaction  





   Details  











Home Medications: 


 Home Medications





Aspirin Low Dose CHEW TAB* [Aspirin Low Dose TAB*] 81 mg PO BEDTIME 17 [

History Confirmed 17]


Omeprazole CAP* [Prilosec CAP* 20 MG] 20 mg PO QAM 17 [History Confirmed 

17]











PMH/Surg Hx/FS Hx/Imm Hx


Cardiovascular History: Reports: Hx Angina - NONE SINCE , Hx Coronary 

Artery Disease - CABG X5 , Hx Hypertension, Other Cardiovascular Problems/

Disorders - CABG


   Denies: Hx Pacemaker/ICD


GI History: Reports: Hx Gastroesophageal Reflux Disease - WELL CONTROLLED


 History: Reports: Other  Problems/Disorders - PMhx UTI


Sensory History: Reports: Hx Contacts or Glasses - GLASSES, Hx Hearing Aid - 

RIGHT EAR RARELY USES


Opthamlomology History: Reports: Hx Contacts or Glasses - GLASSES


Neurological History: Reports: Hx CVA, Hx Dementia, Other Neuro Impairments/

Disorders - DEMENTIA, subarachnoid hemorrhage


Psychiatric History: Reports: Other Psychiatric Issues/Disorders - dementia





- Cancer History


Cancer Type, Location and Year: skin cancer





- Surgical History


Surgery Procedure, Year, and Place: shunt-- [facility].  RIGHT TKR 2006 [facility].  

CABG X5  [facility].  TONSILLECTOMY AS A CHILD


Hx Anesthesia Reactions: No





- Immunization History


Date of Tetanus Vaccine: 3/2013


Date of Influenza Vaccine: 2016


Infectious Disease History: Unable to Obtain/Confirm


Infectious Disease History: 


   Denies: Hx of Known/Suspected MRSA, Traveled Outside the [place] in Last 30 Days





- Family History


Known Family History: Positive: Other - Negative: malignant hyperthermia, 

anesthesia reaction





- Social History


Lives: Assisted Living


Alcohol Use: None


Hx Substance Use: No


Substance Use Type: Reports: None


Hx Tobacco Use: Yes


Smoking Status (MU): Former Smoker


Type: Cigarettes


Amount Used/How Often: 1/2 PPD X 30 YEARS


Have You Smoked in the Last Year: No





Review of Systems


Positive: frequency - Decreased


Neurological: Other - Dizziness


All Other Systems Reviewed And Are Negative: Yes





Physical Exam





- Summary


Physical Exam Summary: 


General: Well appearing, no pain distress, verbal


Skin: Warm, Skin Color Reflects Adequate Perfusion, Dry


Eyes: EOMI, Pupilars are slightly pinpoint, but reactive. 


ENT: Pharynx normal, TMs normal


Neck: Supple, nontender


Respiratory: CTA, breath sounds present, no rhonchi, no wheezes, no rales


Cardiovascular: RRR, no murmur, no rub, no gallop


Abdomen: Soft, nontender, Non-distended, no guarding, no rebound


Bowel: Present


Musculoskeletal: SPENCER, No edema


Neuro: Sensory/motor intact, A&Ox3, CN intact 2-12


Psych: Affect/mood appropriate


Triage Information Reviewed: Yes


Vital Signs On Initial Exam: 


 Initial Vitals











BP


 


 123/46 


 


 17 19:17











Vital Signs Reviewed: Yes





- [place] Coma Scale


Coma Scale Total: 15





Diagnostics





- Vital Signs


 Vital Signs











  Temp Pulse Resp BP Pulse Ox


 


 17 19:30   79  27  136/52  92


 


 17 19:24  98.7 F  77  18  123/46  92


 


 17 19:19   80    91


 


 17 19:17     123/46 














- Laboratory


Lab Results: 


 Lab Results











  17 Range/Units





  19:25 19:25 19:25 


 


WBC  11.7 H    (3.5-10.8)  10^3/ul


 


RBC  5.00    (4.0-5.4)  10^6/ul


 


Hgb  14.5    (14.0-18.0)  g/dl


 


Hct  43    (42-52)  %


 


MCV  86    (80-94)  fL


 


MCH  29    (27-31)  pg


 


MCHC  34    (31-36)  g/dl


 


RDW  13    (10.5-15)  %


 


Plt Count  184    (150-450)  10^3/ul


 


MPV  8    (7.4-10.4)  um3


 


Neut % (Auto)  74.1    (38-83)  %


 


Lymph % (Auto)  13.3 L    (25-47)  %


 


Mono % (Auto)  10.6 H    (1-9)  %


 


Eos % (Auto)  1.4    (0-6)  %


 


Baso % (Auto)  0.6    (0-2)  %


 


Absolute Neuts (auto)  8.7 H    (1.5-7.7)  10^3/ul


 


Absolute Lymphs (auto)  1.6    (1.0-4.8)  10^3/ul


 


Absolute Monos (auto)  1.2 H    (0-0.8)  10^3/ul


 


Absolute Eos (auto)  0.2    (0-0.6)  10^3/ul


 


Absolute Basos (auto)  0.1    (0-0.2)  10^3/ul


 


Absolute Nucleated RBC  0.01    10^3/ul


 


Nucleated RBC %  0.1    


 


INR (Anticoag Therapy)     (0.89-1.11)  


 


Sodium   131 L   (133-145)  mmol/L


 


Potassium   3.4 L   (3.5-5.0)  mmol/L


 


Chloride   100 L   (101-111)  mmol/L


 


Carbon Dioxide   25   (22-32)  mmol/L


 


Anion Gap   6   (2-11)  mmol/L


 


BUN   16   (6-24)  mg/dL


 


Creatinine   0.89   (0.67-1.17)  mg/dL


 


Est GFR ( Amer)   105.5   (>60)  


 


Est GFR (Non-Af Amer)   82.0   (>60)  


 


BUN/Creatinine Ratio   18.0   (8-20)  


 


Glucose   168 H   ()  mg/dL


 


Lactic Acid    1.5  (0.5-2.0)  mmol/L


 


Calcium   8.8   (8.6-10.3)  mg/dL


 


Magnesium   1.9   (1.9-2.7)  mg/dL


 


Total Bilirubin   0.90   (0.2-1.0)  mg/dL


 


AST   14   (13-39)  U/L


 


ALT   14   (7-52)  U/L


 


Alkaline Phosphatase   86   ()  U/L


 


Troponin I   0.03   (<0.04)  ng/mL


 


Total Protein   6.1 L   (6.4-8.9)  g/dL


 


Albumin   3.5   (3.2-5.2)  g/dL


 


Globulin   2.6   (2-4)  g/dL


 


Albumin/Globulin Ratio   1.3   (1-3)  


 


TSH   1.00   (0.34-5.60)  mcIU/mL














  17 Range/Units





  19:25 


 


WBC   (3.5-10.8)  10^3/ul


 


RBC   (4.0-5.4)  10^6/ul


 


Hgb   (14.0-18.0)  g/dl


 


Hct   (42-52)  %


 


MCV   (80-94)  fL


 


MCH   (27-31)  pg


 


MCHC   (31-36)  g/dl


 


RDW   (10.5-15)  %


 


Plt Count   (150-450)  10^3/ul


 


MPV   (7.4-10.4)  um3


 


Neut % (Auto)   (38-83)  %


 


Lymph % (Auto)   (25-47)  %


 


Mono % (Auto)   (1-9)  %


 


Eos % (Auto)   (0-6)  %


 


Baso % (Auto)   (0-2)  %


 


Absolute Neuts (auto)   (1.5-7.7)  10^3/ul


 


Absolute Lymphs (auto)   (1.0-4.8)  10^3/ul


 


Absolute Monos (auto)   (0-0.8)  10^3/ul


 


Absolute Eos (auto)   (0-0.6)  10^3/ul


 


Absolute Basos (auto)   (0-0.2)  10^3/ul


 


Absolute Nucleated RBC   10^3/ul


 


Nucleated RBC %   


 


INR (Anticoag Therapy)  1.05  (0.89-1.11)  


 


Sodium   (133-145)  mmol/L


 


Potassium   (3.5-5.0)  mmol/L


 


Chloride   (101-111)  mmol/L


 


Carbon Dioxide   (22-32)  mmol/L


 


Anion Gap   (2-11)  mmol/L


 


BUN   (6-24)  mg/dL


 


Creatinine   (0.67-1.17)  mg/dL


 


Est GFR ( Amer)   (>60)  


 


Est GFR (Non-Af Amer)   (>60)  


 


BUN/Creatinine Ratio   (8-20)  


 


Glucose   ()  mg/dL


 


Lactic Acid   (0.5-2.0)  mmol/L


 


Calcium   (8.6-10.3)  mg/dL


 


Magnesium   (1.9-2.7)  mg/dL


 


Total Bilirubin   (0.2-1.0)  mg/dL


 


AST   (13-39)  U/L


 


ALT   (7-52)  U/L


 


Alkaline Phosphatase   ()  U/L


 


Troponin I   (<0.04)  ng/mL


 


Total Protein   (6.4-8.9)  g/dL


 


Albumin   (3.2-5.2)  g/dL


 


Globulin   (2-4)  g/dL


 


Albumin/Globulin Ratio   (1-3)  


 


TSH   (0.34-5.60)  mcIU/mL











Result Diagrams: 


 17 19:25





 17 19:25


Lab Statement: Any lab studies that have been ordered have been reviewed, and 

results considered in the medical decision making process.





- Radiology


  ** CXR


Xray Interpretation: Positive (See Comments) - MINIMAL LEFT BASILAR 

ABNORMALITIES WITH TINY PLEURAL EFFUSION


Radiology Interpretation Completed By: Radiologist





- CT


  ** Brain CT


CT Interpretation: No Acute Changes - RIGHT-SIDED SHUNT CATHETER AND MILD 

VENTRICULOMEGALY, BOTH UNCHANGED.


CT Interpretation Completed By: Radiologist





- EKG


  ** 


Cardiac Rate: NL - 84 bpm


EKG Rhythm: Sinus Rhythm


EKG Interpretation: Mild poor R wave progression, no STEMI





Re-Evaluation





- Re-Evaluation


  ** First Eval


Re-Evaluation Time: 21:21


Change: Improved


Comment: Patient is doing well, his wife reports that he seems to have 

improved. She states that his care facility with acquire a urine sample. 

Discussed D/C plan with the patient and his wife.





Complex Multi-Symp Course/Dx


Course Of Treatment: 83 yo male with traumatic brain injury who had a near 

syncopal episode at [facility].  Long discussion with wife, ct brain and labs 

look good, ekg non-acute, pt unable to give urine here, wife's brother  

today of pancreatic cancer and she is working on getting out of town to be 

there for  and so urine will be gotten at [facility] (waiting longer 

for urine, vs cath were option that were also discussed with the wife).





- Diagnoses


Provider Diagnoses: 


 Syncope








Discharge





- Discharge Plan


Condition: Stable


Disposition: HOME


Patient Education Materials:  Syncope (ED)


Referrals: 


Darion Leone MD [Primary Care Provider] - 3 Days





The documentation as recorded by the Shauna castro Rebecca accurately 

reflects the service I personally performed and the decisions made by me, 

Kiesha Baires MD.

## 2017-10-12 ENCOUNTER — HOSPITAL ENCOUNTER (EMERGENCY)
Dept: HOSPITAL 25 - ED | Age: 82
Discharge: HOME | End: 2017-10-12
Payer: COMMERCIAL

## 2017-10-12 VITALS — SYSTOLIC BLOOD PRESSURE: 164 MMHG | DIASTOLIC BLOOD PRESSURE: 60 MMHG

## 2017-10-12 DIAGNOSIS — F17.210: ICD-10-CM

## 2017-10-12 DIAGNOSIS — R55: Primary | ICD-10-CM

## 2017-10-12 LAB
ALBUMIN SERPL BCG-MCNC: 3.5 G/DL (ref 3.2–5.2)
ALP SERPL-CCNC: 79 U/L (ref 34–104)
ALT SERPL W P-5'-P-CCNC: 15 U/L (ref 7–52)
ANION GAP SERPL CALC-SCNC: 6 MMOL/L (ref 2–11)
AST SERPL-CCNC: 11 U/L (ref 13–39)
BUN SERPL-MCNC: 17 MG/DL (ref 6–24)
BUN/CREAT SERPL: 19.8 (ref 8–20)
CALCIUM SERPL-MCNC: 9.1 MG/DL (ref 8.6–10.3)
CHLORIDE SERPL-SCNC: 99 MMOL/L (ref 101–111)
GLOBULIN SER CALC-MCNC: 2.2 G/DL (ref 2–4)
GLUCOSE SERPL-MCNC: 191 MG/DL (ref 70–100)
HCO3 SERPL-SCNC: 32 MMOL/L (ref 22–32)
HCT VFR BLD AUTO: 42 % (ref 42–52)
HGB BLD-MCNC: 14.7 G/DL (ref 14–18)
MAGNESIUM SERPL-MCNC: 2 MG/DL (ref 1.9–2.7)
MCH RBC QN AUTO: 30 PG (ref 27–31)
MCHC RBC AUTO-ENTMCNC: 35 G/DL (ref 31–36)
MCV RBC AUTO: 84 FL (ref 80–94)
POTASSIUM SERPL-SCNC: 3.5 MMOL/L (ref 3.5–5)
PROT SERPL-MCNC: 5.7 G/DL (ref 6.4–8.9)
RBC # BLD AUTO: 4.96 10^6/UL (ref 4–5.4)
SODIUM SERPL-SCNC: 137 MMOL/L (ref 133–145)
TROPONIN I SERPL-MCNC: 0 NG/ML (ref ?–0.04)
TSH SERPL-ACNC: 0.98 MCIU/ML (ref 0.34–5.6)
WBC # BLD AUTO: 9.1 10^3/UL (ref 3.5–10.8)

## 2017-10-12 PROCEDURE — 83735 ASSAY OF MAGNESIUM: CPT

## 2017-10-12 PROCEDURE — 85730 THROMBOPLASTIN TIME PARTIAL: CPT

## 2017-10-12 PROCEDURE — 71010: CPT

## 2017-10-12 PROCEDURE — 81003 URINALYSIS AUTO W/O SCOPE: CPT

## 2017-10-12 PROCEDURE — 70450 CT HEAD/BRAIN W/O DYE: CPT

## 2017-10-12 PROCEDURE — 36415 COLL VENOUS BLD VENIPUNCTURE: CPT

## 2017-10-12 PROCEDURE — 85025 COMPLETE CBC W/AUTO DIFF WBC: CPT

## 2017-10-12 PROCEDURE — 84484 ASSAY OF TROPONIN QUANT: CPT

## 2017-10-12 PROCEDURE — 99282 EMERGENCY DEPT VISIT SF MDM: CPT

## 2017-10-12 PROCEDURE — 84443 ASSAY THYROID STIM HORMONE: CPT

## 2017-10-12 PROCEDURE — 93005 ELECTROCARDIOGRAM TRACING: CPT

## 2017-10-12 PROCEDURE — 80053 COMPREHEN METABOLIC PANEL: CPT

## 2017-10-12 PROCEDURE — 83605 ASSAY OF LACTIC ACID: CPT

## 2017-10-12 NOTE — RAD
Indication: Syncope.



Comparison: June 19, 2017



Technique: Noncontrast CT vertex of skull through foramen magnum.



Report: Unchanged position of the RIGHT frontal shunt catheter with the tip at the RIGHT

lateral ventricle at the level of the foramen of Vinh. Unchanged size of the lateral and

third ventricles with the atria of the LEFT lateral ventricle measuring 2.0 cm transverse

dimension. Unremarkable fourth ventricle and basal cisterns. Mild prominence of the

cerebral sulci without change.



Negative for gray matter white matter obscuration, intra or extra-axial hemorrhage, or

mass effect. Decreased density in the periventricular and subcortical white matter while

non-specific is most likely due to chronic microangiopathy. Unremarkable orbital contents.



No suspicious calvarial or skull base lesion evident. Clear visualized paranasal sinuses

and mastoid air spaces. Unremarkable scalp.



IMPRESSION: 

1. Unchanged position of the RIGHT frontal shunt catheter in size of the ventricles.

2. Mild involutional change and stigmata of chronic small vessel ischemic disease.

3. No acute intracranial process evident.

## 2017-10-13 NOTE — ED
Tanner ARZOLA Angela, scribed for Zaira Cyr MD on 10/12/17 at 1929 .





Syncope/Near Syncope





- HPI Summary


HPI Summary: 





This pt is a 81 y/o male BIBA from [place] (dementia unit) presenting to 

[facility] c/o 2 syncopal episodes and head strike today. Pt is accompanied by staff 

from [place]. Per staff member, pt goes by the name of Alcon. Staff reports 

that during the pt's first syncopal episode he hit the back of his head against 

the door knob of the bathroom. Per staff member, when he stood up he looked 

pale and then syncopized again. A staff member caught the pt before he could 

fall and sat the pt on a chair as he looked very unstable. Pt had no urinary or 

bowel incontinence. Per staff member, pt is verbal. Pt denies chest pain, 

headache, abd pain, back pain. Per staff member, pt walks with a walker. His 

current state is at his baseline, per staff member from [place]. PMHx: 

dementia, HTN, hyperlipidemia.





HPI is limited due to level 5 caveat - dementia.





- History Of Current Complaint


Chief Complaint: EDSyncope


Time Seen by Provider: 10/12/17 19:16


Hx Obtained From: Patient, Family/Caretaker - staff member from [place]


Hx From Patient Unobtainable Due To: Other - Level 5 caveat - dementia


Onset/Duration: Sudden Onset


Timing: Seconds


Context: Witnessed - per staff member (not her)


Aggravating Factor(s): Nothing


Alleviating Factor(s): Spontaneous Resolution





- Allergies/Home Medications


Allergies/Adverse Reactions: 


 Allergies











Allergy/AdvReac Type Severity Reaction Status Date / Time


 


Haloperidol [From Haldol] Allergy  Unknown Verified 04/09/15 10:56





   Reaction  





   Details  














PMH/Surg Hx/FS Hx/Imm Hx


Endocrine/Hematology History: 


   Denies: Hx Diabetes


Cardiovascular History: Reports: Hx Angina - NONE SINCE 2003, Hx Coronary 

Artery Disease - CABG X5 2003, Hx Hypertension, Other Cardiovascular Problems/

Disorders - CABG


   Denies: Hx Pacemaker/ICD


GI History: Reports: Hx Gastroesophageal Reflux Disease - WELL CONTROLLED


 History: Reports: Other  Problems/Disorders - PMhx UTI


Sensory History: Reports: Hx Contacts or Glasses - GLASSES, Hx Hearing Aid - 

RIGHT EAR RARELY USES


Opthamlomology History: Reports: Hx Contacts or Glasses - GLASSES


Neurological History: Reports: Hx CVA, Hx Dementia, Other Neuro Impairments/

Disorders - DEMENTIA, subarachnoid hemorrhage


Psychiatric History: Reports: Other Psychiatric Issues/Disorders - dementia





- Cancer History


Cancer Type, Location and Year: skin cancer





- Surgical History


Surgery Procedure, Year, and Place: shunt-2008- [facility].  RIGHT TKR 2006 [facility].  

CABG X5 2003 [facility].  TONSILLECTOMY AS A CHILD


Hx Anesthesia Reactions: No





- Immunization History


Date of Tetanus Vaccine: 3/2013


Date of Influenza Vaccine: 9/2016


Infectious Disease History: No


Infectious Disease History: 


   Denies: Hx of Known/Suspected MRSA, Traveled Outside the [place] in Last 30 Days





- Family History


Known Family History: Positive: Unknown - LEVEL 5, unable to obtain, Other - 

Negative: malignant hyperthermia, anesthesia reaction





- Social History


Alcohol Use: None


Hx Substance Use: No


Substance Use Type: Reports: None


Hx Tobacco Use: Yes


Smoking Status (MU): Former Smoker


Type: Cigarettes


Amount Used/How Often: 1/2 PPD X 30 YEARS


Have You Smoked in the Last Year: No





Review of Systems


Negative: Fever


Negative: Chest Pain


Negative: Abdominal Pain


Positive: Syncope.  Negative: Headache


All Other Systems Reviewed And Are Negative: No





- Comments


Additional Review of Systems Comments: 





ROS is limited due to level 5 caveat - dementia.





Physical Exam


Triage Information Reviewed: Yes


Vital Signs On Initial Exam: 


 Initial Vitals











Temp Pulse Resp BP Pulse Ox


 


 97.5 F   72   16   149/58   96 


 


 10/12/17 18:51  10/12/17 18:51  10/12/17 18:51  10/12/17 18:51  10/12/17 18:51











Vital Signs Reviewed: Yes


Completion Of Physical Exam Limited Due To: Level 5 - dementia


Appearance: Positive: Well-Nourished


Skin: Positive: Warm, Skin Color Reflects Adequate Perfusion, Dry


Head/Face: Positive: Normal Head/Face Inspection


Eyes: Positive: Normal


ENT: Positive: Normal ENT inspection


Neck: Positive: Supple, Nontender


Respiratory/Lung Sounds: Positive: Clear to Auscultation, Breath Sounds Present


Cardiovascular: Positive: Normal, RRR


Abdomen Description: Positive: Nontender, Soft


Musculoskeletal: Positive: Normal - moving all extremities


Neurological: Positive: Normal, Sensory/Motor Intact, Other - pt has 1 word 

phrases


Psychiatric: Positive: Normal





- Rupali Coma Scale


Coma Scale Total: 15





Diagnostics





- Vital Signs


 Vital Signs











  Temp Pulse Resp BP Pulse Ox


 


 10/12/17 18:51  97.5 F  72  16  149/58  96














- Laboratory


Lab Results: 


 Lab Results











  10/12/17 10/12/17 10/12/17 Range/Units





  19:55 19:55 19:55 


 


WBC  9.1    (3.5-10.8)  10^3/ul


 


RBC  4.96    (4.0-5.4)  10^6/ul


 


Hgb  14.7    (14.0-18.0)  g/dl


 


Hct  42    (42-52)  %


 


MCV  84    (80-94)  fL


 


MCH  30    (27-31)  pg


 


MCHC  35    (31-36)  g/dl


 


RDW  13    (10.5-15)  %


 


Plt Count  185    (150-450)  10^3/ul


 


MPV  8    (7.4-10.4)  um3


 


Neut % (Auto)  70.7    (38-83)  %


 


Lymph % (Auto)  21.9 L    (25-47)  %


 


Mono % (Auto)  5.4    (1-9)  %


 


Eos % (Auto)  1.5    (0-6)  %


 


Baso % (Auto)  0.5    (0-2)  %


 


Absolute Neuts (auto)  6.4    (1.5-7.7)  10^3/ul


 


Absolute Lymphs (auto)  2.0    (1.0-4.8)  10^3/ul


 


Absolute Monos (auto)  0.5    (0-0.8)  10^3/ul


 


Absolute Eos (auto)  0.1    (0-0.6)  10^3/ul


 


Absolute Basos (auto)  0    (0-0.2)  10^3/ul


 


Absolute Nucleated RBC  0.01    10^3/ul


 


Nucleated RBC %  0.1    


 


APTT     (26.0-36.3)  seconds


 


Sodium   137   (133-145)  mmol/L


 


Potassium   3.5   (3.5-5.0)  mmol/L


 


Chloride   99 L   (101-111)  mmol/L


 


Carbon Dioxide   32   (22-32)  mmol/L


 


Anion Gap   6   (2-11)  mmol/L


 


BUN   17   (6-24)  mg/dL


 


Creatinine   0.86   (0.67-1.17)  mg/dL


 


Est GFR ( Amer)   109.8   (>60)  


 


Est GFR (Non-Af Amer)   85.4   (>60)  


 


BUN/Creatinine Ratio   19.8   (8-20)  


 


Glucose   191 H   ()  mg/dL


 


Lactic Acid    1.5  (0.5-2.0)  mmol/L


 


Calcium   9.1   (8.6-10.3)  mg/dL


 


Magnesium   2.0   (1.9-2.7)  mg/dL


 


Total Bilirubin   0.50   (0.2-1.0)  mg/dL


 


AST   11 L   (13-39)  U/L


 


ALT   15   (7-52)  U/L


 


Alkaline Phosphatase   79   ()  U/L


 


Troponin I   0.00   (<0.04)  ng/mL


 


Total Protein   5.7 L   (6.4-8.9)  g/dL


 


Albumin   3.5   (3.2-5.2)  g/dL


 


Globulin   2.2   (2-4)  g/dL


 


Albumin/Globulin Ratio   1.6   (1-3)  


 


TSH   0.98   (0.34-5.60)  mcIU/mL


 


Urine Color     


 


Urine Appearance     


 


Urine pH     (5-9)  


 


Ur Specific Gravity     (1.010-1.030)  


 


Urine Protein     (Negative)  


 


Urine Ketones     (Negative)  


 


Urine Blood     (Negative)  


 


Urine Nitrate     (Negative)  


 


Urine Bilirubin     (Negative)  


 


Urine Urobilinogen     (Negative)  


 


Ur Leukocyte Esterase     (Negative)  


 


Urine Glucose     (Negative)  


 


Urine Ascorbic Acid     (Negative)  














  10/12/17 10/12/17 Range/Units





  19:55 21:50 


 


WBC    (3.5-10.8)  10^3/ul


 


RBC    (4.0-5.4)  10^6/ul


 


Hgb    (14.0-18.0)  g/dl


 


Hct    (42-52)  %


 


MCV    (80-94)  fL


 


MCH    (27-31)  pg


 


MCHC    (31-36)  g/dl


 


RDW    (10.5-15)  %


 


Plt Count    (150-450)  10^3/ul


 


MPV    (7.4-10.4)  um3


 


Neut % (Auto)    (38-83)  %


 


Lymph % (Auto)    (25-47)  %


 


Mono % (Auto)    (1-9)  %


 


Eos % (Auto)    (0-6)  %


 


Baso % (Auto)    (0-2)  %


 


Absolute Neuts (auto)    (1.5-7.7)  10^3/ul


 


Absolute Lymphs (auto)    (1.0-4.8)  10^3/ul


 


Absolute Monos (auto)    (0-0.8)  10^3/ul


 


Absolute Eos (auto)    (0-0.6)  10^3/ul


 


Absolute Basos (auto)    (0-0.2)  10^3/ul


 


Absolute Nucleated RBC    10^3/ul


 


Nucleated RBC %    


 


APTT  30.3   (26.0-36.3)  seconds


 


Sodium    (133-145)  mmol/L


 


Potassium    (3.5-5.0)  mmol/L


 


Chloride    (101-111)  mmol/L


 


Carbon Dioxide    (22-32)  mmol/L


 


Anion Gap    (2-11)  mmol/L


 


BUN    (6-24)  mg/dL


 


Creatinine    (0.67-1.17)  mg/dL


 


Est GFR ( Amer)    (>60)  


 


Est GFR (Non-Af Amer)    (>60)  


 


BUN/Creatinine Ratio    (8-20)  


 


Glucose    ()  mg/dL


 


Lactic Acid    (0.5-2.0)  mmol/L


 


Calcium    (8.6-10.3)  mg/dL


 


Magnesium    (1.9-2.7)  mg/dL


 


Total Bilirubin    (0.2-1.0)  mg/dL


 


AST    (13-39)  U/L


 


ALT    (7-52)  U/L


 


Alkaline Phosphatase    ()  U/L


 


Troponin I    (<0.04)  ng/mL


 


Total Protein    (6.4-8.9)  g/dL


 


Albumin    (3.2-5.2)  g/dL


 


Globulin    (2-4)  g/dL


 


Albumin/Globulin Ratio    (1-3)  


 


TSH    (0.34-5.60)  mcIU/mL


 


Urine Color   Yellow  


 


Urine Appearance   Clear  


 


Urine pH   7.0  (5-9)  


 


Ur Specific Gravity   1.011  (1.010-1.030)  


 


Urine Protein   Negative  (Negative)  


 


Urine Ketones   Negative  (Negative)  


 


Urine Blood   Negative  (Negative)  


 


Urine Nitrate   Negative  (Negative)  


 


Urine Bilirubin   Negative  (Negative)  


 


Urine Urobilinogen   Negative  (Negative)  


 


Ur Leukocyte Esterase   Negative  (Negative)  


 


Urine Glucose   3+(>=500 mg/dl) H  (Negative)  


 


Urine Ascorbic Acid   * H  (Negative)  











Result Diagrams: 


 10/12/17 19:55





 10/12/17 19:55


Lab Statement: Any lab studies that have been ordered have been reviewed, and 

results considered in the medical decision making process.





- Radiology


  ** Chest XR


Xray Interpretation: Positive (See Comments) - IMPRESSION: Cardiomegaly without 

evidence for pulmonary edema. ED physician has reviewed this radiology report 

and agrees.


Radiology Interpretation Completed By: Radiologist





- CT


  ** Brain CT


CT Interpretation: Positive (See Comments) - 1. Unchanged position of the RIGHT 

frontal shunt catheter in size of the ventricles. 2. Mild involutional change 

and stimagta of chronic small vessel ischemic disease. 3. No acute intracranial 

process evident. ED physician has reviewed this radiology report and agrees.


CT Interpretation Completed By: Radiologist





- EKG


  ** 1954


Cardiac Rate: NL - 64 bpm


EKG Rhythm: Sinus Rhythm


EKG Interpretation: Normal QRS, normal QTc.





Re-Evaluation





- Re-Evaluation


  ** First Eval


Re-Evaluation Time: 21:12


Comment: I reviewed the results with the pt.





Course/Dx


Assessment/Plan: Pt is a 81 y/o male BIBA from [place] (dementia unit) 

presents with 2 syncopal episodes and head strike today. CT Brain and chest XR 

are both negative. Troponin is 0.00. UA is unchanged from prior UAs. Pt will be 

discharged and is advised to follow up with his PCP.





- Diagnoses


Provider Diagnoses: 


 Syncope








Discharge





- Discharge Plan


Condition: Stable


Disposition: HOME


Meds/Orders/Equipment: 


Urinalysis w/Refl Micro/Cult Location: Determined By Patient


Patient Education Materials:  Syncope (ED)


Referrals: 


Darion Leone MD [Primary Care Provider] - 2 Days (Syncope.  CT head and 

cardiac work up negative.  Pt requires urinalysis on an outpatient basis. )


Additional Instructions: 


Please follow up with your primary care physician.  A prescription for a 

urinalysis has been written.  Try to get the urine sample and send to the lab 

tomorrow with your doctor following up on this test.  Return if worse or any 

new symptoms.  





The documentation as recorded by the Tanner castro Angela accurately reflects 

the service I personally performed and the decisions made by me, Zaira Cyr MD.

## 2017-11-30 ENCOUNTER — HOSPITAL ENCOUNTER (EMERGENCY)
Dept: HOSPITAL 25 - ED | Age: 82
Discharge: HOME | End: 2017-11-30
Payer: COMMERCIAL

## 2017-11-30 VITALS — DIASTOLIC BLOOD PRESSURE: 61 MMHG | SYSTOLIC BLOOD PRESSURE: 139 MMHG

## 2017-11-30 DIAGNOSIS — K59.00: Primary | ICD-10-CM

## 2017-11-30 LAB
ALBUMIN SERPL BCG-MCNC: 3.8 G/DL (ref 3.2–5.2)
ALP SERPL-CCNC: 71 U/L (ref 34–104)
ALT SERPL W P-5'-P-CCNC: 15 U/L (ref 7–52)
ANION GAP SERPL CALC-SCNC: 5 MMOL/L (ref 2–11)
AST SERPL-CCNC: 14 U/L (ref 13–39)
BUN SERPL-MCNC: 20 MG/DL (ref 6–24)
BUN/CREAT SERPL: 20.8 (ref 8–20)
CALCIUM SERPL-MCNC: 9.2 MG/DL (ref 8.6–10.3)
CHLORIDE SERPL-SCNC: 101 MMOL/L (ref 101–111)
GLOBULIN SER CALC-MCNC: 2.2 G/DL (ref 2–4)
GLUCOSE SERPL-MCNC: 127 MG/DL (ref 70–100)
HCO3 SERPL-SCNC: 31 MMOL/L (ref 22–32)
HCT VFR BLD AUTO: 43 % (ref 42–52)
HGB BLD-MCNC: 14.6 G/DL (ref 14–18)
MCH RBC QN AUTO: 29 PG (ref 27–31)
MCHC RBC AUTO-ENTMCNC: 34 G/DL (ref 31–36)
MCV RBC AUTO: 87 FL (ref 80–94)
POTASSIUM SERPL-SCNC: 3.5 MMOL/L (ref 3.5–5)
PROT SERPL-MCNC: 6 G/DL (ref 6.4–8.9)
RBC # BLD AUTO: 5 10^6/UL (ref 4–5.4)
SODIUM SERPL-SCNC: 137 MMOL/L (ref 133–145)
WBC # BLD AUTO: 8.2 10^3/UL (ref 3.5–10.8)

## 2017-11-30 PROCEDURE — 86140 C-REACTIVE PROTEIN: CPT

## 2017-11-30 PROCEDURE — 80053 COMPREHEN METABOLIC PANEL: CPT

## 2017-11-30 PROCEDURE — 74177 CT ABD & PELVIS W/CONTRAST: CPT

## 2017-11-30 PROCEDURE — 81003 URINALYSIS AUTO W/O SCOPE: CPT

## 2017-11-30 PROCEDURE — 85025 COMPLETE CBC W/AUTO DIFF WBC: CPT

## 2017-11-30 PROCEDURE — 74020: CPT

## 2017-11-30 PROCEDURE — 99283 EMERGENCY DEPT VISIT LOW MDM: CPT

## 2017-11-30 PROCEDURE — 36415 COLL VENOUS BLD VENIPUNCTURE: CPT

## 2017-11-30 NOTE — RAD
CLINICAL HISTORY: Incontinence of stool and urine. Relevant surgical history includes

appendectomy.



COMPARISON: None



TECHNIQUE: Contrast enhanced CT examination of the abdomen and pelvis from the lung bases

through the initial tuberosities. The patient received 123 mL Omnipaque 300 intravenously

prior to imaging.The patient received oral contrast as well prior to imaging.



FINDINGS:



VISUALIZED LUNG BASES: 

There are hypoventilatory changes at the bilateral lung bases. Otherwise the visualized

lung bases are grossly clear. There is no pleural effusion.



ABDOMEN AND PELVIS:



A ventriculoperitoneal shunt is partially imaged overlying the right hemithorax

subcutaneous tissue, coursing down the right hemiabdomen and right flank before

terminating at the mid-level right peritoneal cavity.



There are at least 4 focal hypodensities in the liver the largest in the left lobe

measuring 2.1 cm in diameter and a Hounsfield units consistent with a simple cyst. The

smaller lesions are too small to characterize further. The liver is otherwise homogenous

in attenuation.



The spleen, pancreas and adrenal glands are grossly normal in appearance. 



The gallbladder is normal.



The kidneys are normal in appearance without focal mass, calcification or signs of

hydronephrosis.



The oral contrast has progressed as far as the distal small bowel. The small and large

bowel are not distended. Consistent with the patient's reported surgical history, the

appendix is not identified. There is gas and stool seen throughout the length of the

colon. There is no definite wall thickening or focal inflammatory change of the colon.



There is no gross retroperitoneal or mesenteric lymphadenopathy.



There is coarse calcification in the prostate gland.



The coarsely calcified abdominal aorta and iliac arteries are normal in course and

diameter. On the sagittal view images there is coarse atherosclerotic calcification

narrowing the origins of the celiac trunk and superior mesenteric artery. There is

high-grade stenosis at the origin of the celiac trunk (sagittal image 85) and at least 75%

degree stenosis at the superior mesenteric artery (image 83 on the sagittal images).



Degenerative changes include multilevel loss of intervertebral disc height involving the

lower thoracic and lumbar spine, multilevel vacuum disc phenomenon and marginal osteophyte

formation.





IMPRESSION:



1. No focal inflammatory change involving the gastrointestinal tract or signs of

obstruction.

2. Advanced calcified atherosclerosis of the abdominal aorta includes the origins of the

celiac trunk and superior mesenteric artery. Please correlate to any signs or symptoms of

mesenteric ischemia which is characterized by postprandial pain and weight loss. If

clinically warranted further characterization can be made with mesenteric arterial

ultrasound to measure flow velocities at the origins of these arteries.

3. Additional chronic, degenerative and iatrogenic findings described in the body the

report.

## 2017-11-30 NOTE — ED
Peace ARZOLA Thomas, scribed for Jerardo Carreon MD on 11/30/17 at 1058 .





GI/ HPI





- HPI Summary


HPI Summary: 





The pt is an 83 y/o M brought to the ED from the nursing home with new-onset 

incontinence of urine and stool.  Per daughter, there were concerns that the 

patient was constipated in the last few days. PMHx includes subarachnoid 

hemorrhage about eight years ago. The patient does not make his needs known. He 

is able to talk but does not often speak his mind. Per daughter, he rarely 

expresses pain. 





- History of Current Complaint


Chief Complaint: EDGeneral


Time Seen by Provider: 11/30/17 10:39


Stated Complaint: CONSTIPATION/DIARRHEA


Onset/Duration: Started Days Ago - last few days, Still Present


Timing: Constant


Current Severity: Moderate


Pain Intensity: 0


Associated Signs and Symptoms: Positive: Other: - Incontinence of urine and 

stool, constipation


Aggravating Factor(s): Nothing


Alleviating Factor(s): Nothing





- Additional Pertinent History


Primary Care Physician: [id number]





- Allergy/Home Medications


Allergies/Adverse Reactions: 


 Allergies











Allergy/AdvReac Type Severity Reaction Status Date / Time


 


Haloperidol [From Haldol] Allergy  Unknown Verified 11/30/17 10:31





   Reaction  





   Details  











Home Medications: 


 Home Medications





Acetaminophen [Acetaminophen Extra Stren] 500 mg PO Q4HR PRN 11/30/17 [History 

Confirmed 11/30/17]


Docusate CAP* [Colace Cap*] 100 mg PO BID 11/30/17 [History Confirmed 11/30/17]


Emollient [Aquaphor Advanced Therapy] 1 oin TOPICAL BID 11/30/17 [History 

Confirmed 11/30/17]


Nutritional Supplements [Ensure Plus] 1 can PO BID 11/30/17 [History Confirmed 

11/30/17]











PMH/Surg Hx/FS Hx/Imm Hx


Previously Healthy: No


Endocrine/Hematology History: 


   Denies: Hx Diabetes


Cardiovascular History: Reports: Hx Angina - NONE SINCE 2003, Hx Coronary 

Artery Disease - CABG X5 2003, Hx Hypertension, Other Cardiovascular Problems/

Disorders - CABG


   Denies: Hx Pacemaker/ICD


GI History: Reports: Hx Gastroesophageal Reflux Disease - WELL CONTROLLED


 History: Reports: Other  Problems/Disorders - PMhx UTI


Sensory History: Reports: Hx Contacts or Glasses - GLASSES, Hx Hearing Aid - 

RIGHT EAR RARELY USES


Opthamlomology History: Reports: Hx Contacts or Glasses - GLASSES


Neurological History: Reports: Hx CVA, Hx Dementia, Other Neuro Impairments/

Disorders - DEMENTIA, subarachnoid hemorrhage


Psychiatric History: Reports: Other Psychiatric Issues/Disorders - dementia





- Cancer History


Cancer Type, Location and Year: skin cancer





- Surgical History


Surgery Procedure, Year, and Place: shunt-2008- [facility].  RIGHT TKR 2006 [facility].  

CABG X5 2003 [facility].  TONSILLECTOMY AS A CHILD


Hx Anesthesia Reactions: No





- Immunization History


Date of Tetanus Vaccine: 3/2013


Date of Influenza Vaccine: 9/2016


Infectious Disease History: Unable to Obtain/Confirm


Infectious Disease History: 


   Denies: Hx of Known/Suspected MRSA, Traveled Outside the [place] in Last 30 Days





- Family History


Known Family History: Positive: Other - Negative: malignant hyperthermia, 

anesthesia reaction





- Social History


Occupation: Unemployed


Lives: At The Nursing Home


Alcohol Use: None


Hx Substance Use: No


Substance Use Type: Reports: None


Hx Tobacco Use: Yes


Smoking Status (MU): Former Smoker


Type: Cigarettes


Amount Used/How Often: 1/2 PPD X 30 YEARS


Have You Smoked in the Last Year: No





Review of Systems


Negative: Fever


Positive: Other - Incontinence of stool, constipation


Positive: incontinence


All Other Systems Reviewed And Are Negative: Yes





Physical Exam





- Summary


Physical Exam Summary: 





VITAL SIGNS: Reviewed.


GENERAL: Patient is a well-developed and nourished male who is lying 

comfortable in the stretcher. Patient is not in any acute respiratory distress.


HEAD AND FACE: No signs of trauma. No ecchymosis, hematomas or skull 

depressions. No sinus tenderness.


EYES: PERRLA, EOMI x 2, No injected conjunctiva, no nystagmus.


EARS: Hearing grossly intact. Ear canals and tympanic membranes are within 

normal limits.


MOUTH: Oropharynx within normal limits.


NECK: Supple, trachea is midline, no adenopathy, no JVD, no carotid bruit, no c-

spine tenderness, neck with full ROM.


CHEST: Symmetric, no tenderness at palpation


LUNGS: Clear to auscultation bilaterally. No wheezing or crackles.


CVS: Regular rate and rhythm, S1 and S2 present, no murmurs or gallops 

appreciated.


ABDOMEN: Soft, non-tender. Distended abdomen. No rebound no guarding, and no 

masses palpated. Hypoactive bowel sounds. 


RECTAL: There is no fecal impaction. Stool is brown. 


BLADDER: A post void residual using a bladder scan is 23 cc. 


EXTREMITIES: FROM in all major joints, no edema, no cyanosis or clubbing.


NEURO: Alert, oriented, and he knows his name. He thinks slowly. No acute 

neurological deficits. 


SKIN: Dry and warm


Triage Information Reviewed: Yes


Vital Signs On Initial Exam: 


 Initial Vitals











Temp Pulse Resp BP Pulse Ox


 


 96.8 F   81   16   150/58   96 


 


 11/30/17 10:31  11/30/17 10:31  11/30/17 10:31  11/30/17 10:31  11/30/17 10:31











Vital Signs Reviewed: Yes





Diagnostics





- Vital Signs


 Vital Signs











  Temp Pulse Resp BP Pulse Ox


 


 11/30/17 10:31  96.8 F  81  16  150/58  96














- Laboratory


Result Diagrams: 


 11/30/17 12:02





 11/30/17 12:02


Lab Statement: Any lab studies that have been ordered have been reviewed, and 

results considered in the medical decision making process.





- Radiology


  ** XR Abdomen


Xray Interpretation: Positive (See Comments) - 1. Significant stool in the 

colon with rectal distention. No evidence for bowel obstruction. 2. Moderately 

distended stomach with gas and at least partially liquid food stuff. Correlate 

with timing of food ingestion and consider potential gastroparesis. ED 

physician has reviewed this report and agrees.


Radiology Interpretation Completed By: Radiologist





- CT


  ** CT Abd/Pel


CT Interpretation: No Acute Changes - 1. No focal inflammatory change involving 

the gastrointestinal tract or signs of obstruction. 2. Advanced calcified 

atherosclerosis of the abdominal aorta includes the origins of the celiac trunk 

and superior mesenteric artery. Please correlate to any signs or symptoms of 

mesenteric ischemia which is characterized by postprandial pain and weight 

loss. If clinically warranted further characterization can be made with 

mesenteric arterial ultrasound to measure flow velocities at the origins of 

these arteries. 3. Additional chronic, degenerative and iatrogenic findings 

described in the body the report. ED physician has reviewed this report and 

agrees.


CT Interpretation Completed By: Radiologist





MEET Course/Dx





- Course


Assessment/Plan: The pt is an 83 y/o M brought to the ED from the nursing home 

with new-onset incontinence of urine and stool.  Per daughter, there were 

concerns that the patient was constipated in the last few days. In the ED 

course the patient was given Magnesium citrate and Dulcolax. Bloodwork and 

urinalysis were obtained. XR Abdomen and CT Abd/Pel were obtained. The patient 

is diagnosed with constipation. The patient is instructed to follow up with 

primary care. I did discuss with the patients family member the results of the 

CT scan. I advised the patient be treated at home with magnesium citrate and 

Dulcolax. I also recommended that the patient increase his fiber intake by 

taking Metamucil.





- Diagnoses


Provider Diagnoses: 


 Constipation








Discharge





- Discharge Plan


Condition: Stable


Disposition: HOME


Patient Education Materials:  Constipation (ED), High Fiber Diet (ED)


Referrals: 


Darion Leone MD [Primary Care Provider] - 3 Days


Additional Instructions: 


I recommend that you increase your fiber intake by taking Metamucil daily. 





Return to the emergency department for any new or worsening symptoms. 





The documentation as recorded by the Peace castro Thomas accurately reflects 

the service I personally performed and the decisions made by me, Jerardo Carreon MD.

## 2017-11-30 NOTE — RAD
Indication: Constipation. 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

brain hemorrhage.



Comparison: No relevant prior exams available on the [facility] PACS for comparison.



Technique: Supine and upright abdomen views.



Report: Median sternotomy wires. Mediastinal vascular clips.



No radiographic evidence for free air.



Moderately distended stomach with gas and at least partially liquid food stuff. No dilated

small or large bowel loops evident. Large volume of stool within the colon without colonic

dilatation. Significant rectal distention with formed appearing stool.



Few pelvic phleboliths noted. No suspicious calcifications. Ventriculoperitoneal catheter

tip at the inferior LEFT pelvis.



Unremarkable soft tissue contours.



IMPRESSION: 

1. Significant stool in the colon with rectal distention. No evidence for bowel

obstruction.

2. Moderately distended stomach with gas and at least partially liquid food stuff.

Correlate with timing of food ingestion and consider potential gastroparesis.

## 2018-06-02 ENCOUNTER — HOSPITAL ENCOUNTER (EMERGENCY)
Dept: HOSPITAL 25 - ED | Age: 83
Discharge: HOME | End: 2018-06-02
Payer: COMMERCIAL

## 2018-06-02 VITALS — SYSTOLIC BLOOD PRESSURE: 144 MMHG | DIASTOLIC BLOOD PRESSURE: 68 MMHG

## 2018-06-02 DIAGNOSIS — I25.119: ICD-10-CM

## 2018-06-02 DIAGNOSIS — Z79.82: ICD-10-CM

## 2018-06-02 DIAGNOSIS — I10: ICD-10-CM

## 2018-06-02 DIAGNOSIS — Z87.891: ICD-10-CM

## 2018-06-02 DIAGNOSIS — K21.9: ICD-10-CM

## 2018-06-02 DIAGNOSIS — J40: Primary | ICD-10-CM

## 2018-06-02 DIAGNOSIS — Z95.1: ICD-10-CM

## 2018-06-02 DIAGNOSIS — Z79.899: ICD-10-CM

## 2018-06-02 DIAGNOSIS — Z88.8: ICD-10-CM

## 2018-06-02 LAB
BASOPHILS # BLD AUTO: 0.1 10^3/UL (ref 0–0.2)
EOSINOPHIL # BLD AUTO: 0.2 10^3/UL (ref 0–0.6)
HCT VFR BLD AUTO: 41 % (ref 42–52)
HGB BLD-MCNC: 14.3 G/DL (ref 14–18)
LYMPHOCYTES # BLD AUTO: 1.8 10^3/UL (ref 1–4.8)
MCH RBC QN AUTO: 29 PG (ref 27–31)
MCHC RBC AUTO-ENTMCNC: 35 G/DL (ref 31–36)
MCV RBC AUTO: 85 FL (ref 80–94)
MONOCYTES # BLD AUTO: 1 10^3/UL (ref 0–0.8)
NEUTROPHILS # BLD AUTO: 5.2 10^3/UL (ref 1.5–7.7)
NRBC # BLD AUTO: 0 10^3/UL
NRBC BLD QL AUTO: 0.1
PLATELET # BLD AUTO: 165 10^3/UL (ref 150–450)
RBC # BLD AUTO: 4.86 10^6/UL (ref 4–5.4)
WBC # BLD AUTO: 8.3 10^3/UL (ref 3.5–10.8)

## 2018-06-02 PROCEDURE — 36415 COLL VENOUS BLD VENIPUNCTURE: CPT

## 2018-06-02 PROCEDURE — 80053 COMPREHEN METABOLIC PANEL: CPT

## 2018-06-02 PROCEDURE — 87040 BLOOD CULTURE FOR BACTERIA: CPT

## 2018-06-02 PROCEDURE — 85025 COMPLETE CBC W/AUTO DIFF WBC: CPT

## 2018-06-02 PROCEDURE — 71045 X-RAY EXAM CHEST 1 VIEW: CPT

## 2018-06-02 PROCEDURE — 83605 ASSAY OF LACTIC ACID: CPT

## 2018-06-02 PROCEDURE — 99282 EMERGENCY DEPT VISIT SF MDM: CPT

## 2018-06-02 NOTE — RAD
Indication: Cough, fever. Cardiac disease and history of tobacco use.



Comparison: November 30, 2017 CT abdomen. October 12, 2017 chest radiograph.



Technique: Upright AP 1600 hours



Report: RIGHT side ventriculoperitoneal shunt catheter. Median sternotomy wires and

mediastinal vascular clips. Unchanged cardiomegaly. Unremarkable central pulmonary

vasculature. Small calcified granuloma at the peripheral RIGHT upper lung zone without

change. Otherwise grossly clear lungs and pleural spaces. Negative for pneumothorax.



IMPRESSION: Cardiomegaly without evidence for pulmonary edema. No evidence for pneumonia.

## 2018-06-02 NOTE — ED
Willy ARZOLA Natalie, scribed for Nikolay Cazares MD on 06/02/18 at 1600 .





Respiratory





- HPI Summary


HPI Summary: 


The patient is an 82 y/o M presenting to the ED c/o sinus congestion, cough, 

and fever (101.6F) starting two days ago. He states he is not in any pain. He 

has been taking Mucinex and Tylenol to treat his symptoms PTA. Pt additionally c

/o dizziness and near syncope. He did not get out of bed yesterday. He has hx 

of dementia, TBI, BPH, HTN, insomnia, and hyperlipidemia. His PCP is Dr. Leone.





- History of Current Complaint


Chief Complaint: EDUpperRespComplaint


Stated Complaint: FLU LIKE SYMPTOMS


Time Seen by Provider: 06/02/18 15:23


Hx Obtained From: Patient


Onset/Duration: Sudden Onset, Lasting Days, Still Present


Timing: Constant


Initial Severity: Mild


Current Severity: Mild


Pain Intensity: 0


Character: Cough (Nonproductive)


Aggravating Factor(s): Nothing


Alleviating Factor(s): Nothing


Associated Signs and Symptoms: Fever





- Allergy/Home Medications


Allergies/Adverse Reactions: 


 Allergies











Allergy/AdvReac Type Severity Reaction Status Date / Time


 


haloperidol [From Haldol] Allergy  Unknown Verified 06/02/18 13:45





   Reaction  





   Details  











Home Medications: 


 Home Medications





Acetaminophen [Tylenol Extra Strength] 500 mg PO Q4H PRN 06/02/18 [History 

Confirmed 06/02/18]


Aspirin 81 mg CHEW TAB* [Aspirin Low Dose TAB*] 81 mg PO QPM 06/02/18 [History 

Confirmed 06/02/18]


Psyllium Husk [Metamucil] 0.4 gm PO BID 06/02/18 [History Confirmed 06/02/18]


amLODIPine TAB* [Norvasc 5 mg TAB*] 2.5 mg PO QPM 06/02/18 [History Confirmed 06 /02/18]











PMH/Surg Hx/FS Hx/Imm Hx


Endocrine/Hematology History: 


   Denies: Hx Diabetes


Cardiovascular History: Reports: Hx Angina - NONE SINCE 2003, Hx Coronary 

Artery Disease - CABG X5 2003, Hx Hypertension, Other Cardiovascular Problems/

Disorders - CABG


   Denies: Hx Pacemaker/ICD


GI History: Reports: Hx Gastroesophageal Reflux Disease - WELL CONTROLLED


 History: Reports: Other  Problems/Disorders - PMhx UTI


   Denies: Hx Renal Disease


Sensory History: Reports: Hx Contacts or Glasses - GLASSES, Hx Hearing Aid - 

RIGHT EAR RARELY USES


Opthamlomology History: Reports: Hx Contacts or Glasses - GLASSES


Neurological History: Reports: Hx CVA, Hx Dementia, Other Neuro Impairments/

Disorders - DEMENTIA, subarachnoid hemorrhage


Psychiatric History: Reports: Other Psychiatric Issues/Disorders - dementia





- Cancer History


Cancer Type, Location and Year: skin cancer





- Surgical History


Surgery Procedure, Year, and Place: shunt-2008- [facility].  RIGHT TKR 2006 [facility].  

CABG X5 2003 [facility].  TONSILLECTOMY AS A CHILD


Hx Anesthesia Reactions: No





- Immunization History


Date of Tetanus Vaccine: 3/2013


Date of Influenza Vaccine: 9/2016


Infectious Disease History: No


Infectious Disease History: 


   Denies: Hx of Known/Suspected MRSA, Traveled Outside the US in Last 30 Days





- Family History


Known Family History: Positive: Other - Negative: malignant hyperthermia, 

anesthesia reaction





- Social History


Alcohol Use: None


Hx Substance Use: No


Substance Use Type: Reports: None


Hx Tobacco Use: Yes


Smoking Status (MU): Former Smoker


Type: Cigarettes


Amount Used/How Often: 1/2 PPD X 30 YEARS


Have You Smoked in the Last Year: No





Review of Systems


Positive: Fever - 101.6F


Positive: Other - sinus congestion


Positive: Cough


Neurological: Other - dizziness and near syncope


All Other Systems Reviewed And Are Negative: Yes





Physical Exam





- Summary


Physical Exam Summary: 


Appearance: The patient is well-nourished in no acute distress and in no acute 

pain.


 


Skin: The skin is warm and dry and skin color reflects adequate perfusion.


 


HEENT: The head is normocephalic and atraumatic. The pupils are equal and 

reactive. The conjunctivae are clear and without drainage. Nares are patent and 

without drainage. Mouth reveals moist mucous membranes and the throat is 

without erythema and exudate. The external ears are intact. The ear canals are 

patent and without drainage. The tympanic membranes are intact.


 


Neck: the neck is supple with full range of motion and non-tender. There are no 

carotid bruits. There is no neck vein distension.


 


Respiratory: Chest is non-tender. Lungs are clear to auscultation and breath 

sounds are symmetrical and equal.


 


Cardiovascular: Heart is regular rate and rhythm. There is no murmur or rub 

auscultated. There is no peripheral edema and pulses are symmetrical and equal.


 


Abdomen: The abdomen is soft and non-tender. There are normal bowel sounds 

heard in all four quadrants and there is no organomegaly palpated.


 


Musculoskeletal: There is no back tenderness noted. Extremities are non-tender 

with full range of motion. There is good capillary refill. There is no 

peripheral edema or calf tenderness elicited.


 


Neurological: Patient is alert and oriented to person, place and time. The 

patient has symmetrical motor strength in all four extremities. Cranial nerves 

are grossly intact. Deep tendon reflexes are symmetrical and equal in all four 

extremities.


 


Psychiatric: The patient has an appropriate affect and does not exhibit any 

anxiety or depression.


Triage Information Reviewed: Yes


Vital Signs On Initial Exam: 


 Initial Vitals











Temp Pulse Resp BP Pulse Ox


 


 97.9 F   83   20   156/80   94 


 


 06/02/18 13:45  06/02/18 13:45  06/02/18 13:45  06/02/18 13:45  06/02/18 13:45











Vital Signs Reviewed: Yes





Diagnostics





- Vital Signs


 Vital Signs











  Temp Pulse Resp BP Pulse Ox


 


 06/02/18 13:45  97.9 F  83  20  156/80  94














- Laboratory


Lab Results: 


 Lab Results











  06/02/18 06/02/18 06/02/18 Range/Units





  16:41 16:41 16:41 


 


WBC  8.3    (3.5-10.8)  10^3/ul


 


RBC  4.86    (4.0-5.4)  10^6/ul


 


Hgb  14.3    (14.0-18.0)  g/dl


 


Hct  41 L    (42-52)  %


 


MCV  85    (80-94)  fL


 


MCH  29    (27-31)  pg


 


MCHC  35    (31-36)  g/dl


 


RDW  13    (10.5-15)  %


 


Plt Count  165    (150-450)  10^3/ul


 


MPV  7.5    (7.4-10.4)  um3


 


Neut % (Auto)  62.9    (38-83)  %


 


Lymph % (Auto)  21.1 L    (25-47)  %


 


Mono % (Auto)  12.5 H    (0-7)  %


 


Eos % (Auto)  2.4    (0-6)  %


 


Baso % (Auto)  1.1    (0-2)  %


 


Absolute Neuts (auto)  5.2    (1.5-7.7)  10^3/ul


 


Absolute Lymphs (auto)  1.8    (1.0-4.8)  10^3/ul


 


Absolute Monos (auto)  1.0 H    (0-0.8)  10^3/ul


 


Absolute Eos (auto)  0.2    (0-0.6)  10^3/ul


 


Absolute Basos (auto)  0.1    (0-0.2)  10^3/ul


 


Absolute Nucleated RBC  0    10^3/ul


 


Nucleated RBC %  0.1    


 


Sodium   134 L   (139-145)  mmol/L


 


Potassium   4.1   (3.5-5.0)  mmol/L


 


Chloride   102   (101-111)  mmol/L


 


Carbon Dioxide   26   (22-32)  mmol/L


 


Anion Gap   6   (2-11)  mmol/L


 


BUN   16   (6-24)  mg/dL


 


Creatinine   0.92   (0.67-1.17)  mg/dL


 


Est GFR ( Amer)   101.3   (>60)  


 


Est GFR (Non-Af Amer)   78.8   (>60)  


 


BUN/Creatinine Ratio   17.4   (8-20)  


 


Glucose   95   ()  mg/dL


 


Lactic Acid    0.7  (0.5-2.0)  mmol/L


 


Calcium   9.0   (8.6-10.3)  mg/dL


 


Total Bilirubin   0.90   (0.2-1.0)  mg/dL


 


AST   15   (13-39)  U/L


 


ALT   13   (7-52)  U/L


 


Alkaline Phosphatase   70   ()  U/L


 


Total Protein   6.3 L   (6.4-8.9)  g/dL


 


Albumin   3.8   (3.2-5.2)  g/dL


 


Globulin   2.5   (2-4)  g/dL


 


Albumin/Globulin Ratio   1.5   (1-3)  











Result Diagrams: 


 06/02/18 16:41





 06/02/18 16:41


Lab Statement: Any lab studies that have been ordered have been reviewed, and 

results considered in the medical decision making process.





- Radiology


  ** CXR


Xray Interpretation: No Acute Changes - Cardiomegaly without evidence for 

pulmonary edema. No evidence for pneumonia. ED physician has reviewed this 

report.


Radiology Interpretation Completed By: Radiologist





Re-Evaluation





- Re-Evaluation


  ** First Eval


Re-Evaluation Time: 17:36


Change: Improved - I spoke with the patient about imagine results and discharge 

home.


Comment: I spoke with the patient concerning imaging results and discharge home.





Disposition





- Course


Course Of Treatment: Mr. Dai presents to the emergency department with the 

complaint of a couple of days of coughing and congestion and intermittent 

fevers.  His chest x-ray was negative here and his labs are within normal 

limits.  His vitals were also within normal limits and he was nontoxic in 

appearance but he did have a wet cough.  I think this is bronchitis and I will 

treat him with Z-Chago.





- Diagnoses


Provider Diagnoses: 


 Bronchitis








Discharge





- Sign-Out/Discharge


Documenting (check all that apply): Discharge/Admit/Transfer





- Discharge Plan


Condition: Stable


Disposition: HOME


Prescriptions: 


Azithromycin TAB* [Zithromax TAB (Z-CHAGO) 250 mg #6 tabs] 2 tab PO .TODAY, THEN 

1 DAILY #1 chago


Clarithromycin TAB* [Biaxin TAB*] 500 mg PO BID #20 tab


Patient Education Materials:  Acute Bronchitis (ED)


Referrals: 


Darion Leone MD [Primary Care Provider] - 3 Days


Additional Instructions: 


Please take medication as prescribed.





Follow up with your primary care provider in 2-3 days. 





Return to the emergency department for any new or worsening symptoms. 





- Billing Disposition and Condition


Condition: STABLE


Disposition: HOME





The documentation as recorded by the Willy castro Natalie accurately reflects 

the service I personally performed and the decisions made by me, Nikolay Cazares MD.

## 2018-08-31 ENCOUNTER — HOSPITAL ENCOUNTER (OUTPATIENT)
Dept: HOSPITAL 25 - ED | Age: 83
Setting detail: OBSERVATION
LOS: 1 days | Discharge: HOME | End: 2018-09-01
Attending: HOSPITALIST | Admitting: INTERNAL MEDICINE
Payer: COMMERCIAL

## 2018-08-31 DIAGNOSIS — R00.0: ICD-10-CM

## 2018-08-31 DIAGNOSIS — Z87.891: ICD-10-CM

## 2018-08-31 DIAGNOSIS — R65.10: Primary | ICD-10-CM

## 2018-08-31 DIAGNOSIS — Z95.1: ICD-10-CM

## 2018-08-31 DIAGNOSIS — Z79.02: ICD-10-CM

## 2018-08-31 DIAGNOSIS — I10: ICD-10-CM

## 2018-08-31 DIAGNOSIS — D72.829: ICD-10-CM

## 2018-08-31 DIAGNOSIS — I25.10: ICD-10-CM

## 2018-08-31 DIAGNOSIS — F03.90: ICD-10-CM

## 2018-08-31 DIAGNOSIS — R50.9: ICD-10-CM

## 2018-08-31 DIAGNOSIS — E78.5: ICD-10-CM

## 2018-08-31 DIAGNOSIS — Z96.651: ICD-10-CM

## 2018-08-31 DIAGNOSIS — R94.31: ICD-10-CM

## 2018-08-31 DIAGNOSIS — N40.0: ICD-10-CM

## 2018-08-31 DIAGNOSIS — Z79.899: ICD-10-CM

## 2018-08-31 LAB
BASOPHILS # BLD AUTO: 0.1 10^3/UL (ref 0–0.2)
EOSINOPHIL # BLD AUTO: 0.1 10^3/UL (ref 0–0.6)
HCT VFR BLD AUTO: 44 % (ref 42–52)
HGB BLD-MCNC: 15.2 G/DL (ref 14–18)
INR PPP/BLD: 0.94 (ref 0.77–1.02)
LYMPHOCYTES # BLD AUTO: 1.7 10^3/UL (ref 1–4.8)
MCH RBC QN AUTO: 30 PG (ref 27–31)
MCHC RBC AUTO-ENTMCNC: 35 G/DL (ref 31–36)
MCV RBC AUTO: 85 FL (ref 80–94)
MONOCYTES # BLD AUTO: 1.1 10^3/UL (ref 0–0.8)
NEUTROPHILS # BLD AUTO: 8 10^3/UL (ref 1.5–7.7)
NRBC # BLD AUTO: 0 10^3/UL
NRBC BLD QL AUTO: 0.2
PLATELET # BLD AUTO: 182 10^3/UL (ref 150–450)
RBC # BLD AUTO: 5.14 10^6/UL (ref 4–5.4)
WBC # BLD AUTO: 11 10^3/UL (ref 3.5–10.8)

## 2018-08-31 PROCEDURE — 80053 COMPREHEN METABOLIC PANEL: CPT

## 2018-08-31 PROCEDURE — 93005 ELECTROCARDIOGRAM TRACING: CPT

## 2018-08-31 PROCEDURE — G0378 HOSPITAL OBSERVATION PER HR: HCPCS

## 2018-08-31 PROCEDURE — 84145 PROCALCITONIN (PCT): CPT

## 2018-08-31 PROCEDURE — 87040 BLOOD CULTURE FOR BACTERIA: CPT

## 2018-08-31 PROCEDURE — 99285 EMERGENCY DEPT VISIT HI MDM: CPT

## 2018-08-31 PROCEDURE — 85610 PROTHROMBIN TIME: CPT

## 2018-08-31 PROCEDURE — 96372 THER/PROPH/DIAG INJ SC/IM: CPT

## 2018-08-31 PROCEDURE — 71045 X-RAY EXAM CHEST 1 VIEW: CPT

## 2018-08-31 PROCEDURE — 86140 C-REACTIVE PROTEIN: CPT

## 2018-08-31 PROCEDURE — 84484 ASSAY OF TROPONIN QUANT: CPT

## 2018-08-31 PROCEDURE — 87641 MR-STAPH DNA AMP PROBE: CPT

## 2018-08-31 PROCEDURE — 85025 COMPLETE CBC W/AUTO DIFF WBC: CPT

## 2018-08-31 PROCEDURE — 36415 COLL VENOUS BLD VENIPUNCTURE: CPT

## 2018-08-31 PROCEDURE — 81003 URINALYSIS AUTO W/O SCOPE: CPT

## 2018-08-31 PROCEDURE — 85730 THROMBOPLASTIN TIME PARTIAL: CPT

## 2018-08-31 PROCEDURE — 83605 ASSAY OF LACTIC ACID: CPT

## 2018-08-31 NOTE — ED
HPI Febrile Illness





- HPI Summary


HPI Summary: 


This patient is a 83 year [facility] 

a chief complaint of fever since this afternoon. Staff at the patients 

assisted living facility informed told the patients son that the patient was 

shaking badly. The patient rates the pain 0/10 in severity. Symptoms aggravated 

by nothing. Symptoms alleviated by nothing. Patient denies HA, rhinorrhea, 

chest pain, abdominal pain, or nausea. Patient resides at [facility] and has 

PMHx of dementia, knee replacement, brain aneurysm, quadruple bypass surgery, 

and CVA.


LEVEL FIVE CAVEAT DUE TO PATIENT'S DEMENTIA.








- History of Current Complaint


Chief Complaint: EDGeneral


Time Seen by Provider: 08/31/18 19:26


Hx Obtained From: Patient, Family/Caretaker - Patient's son


Onset/Duration: Started Hours Ago, Atraumatic, Still Present


Timing: Constant


Current Severity: None


Pain Intensity: 0


Pain Scale Used: 0-10 Numeric


Aggravating Factors: Nothing


Alleviating Factors: Nothing


Associated Signs and Symptoms: Chills





- Additional Pertinent History


Primary Care Physician: BEATRICE





- Allergy/Home Medications


Allergies/Adverse Reactions: 


 Allergies











Allergy/AdvReac Type Severity Reaction Status Date / Time


 


haloperidol [From Haldol] Allergy  Unknown Verified 08/31/18 18:44





   Reaction  





   Details  














PMH/Surg Hx/FS Hx/Imm Hx


Endocrine/Hematology History: 


   Denies: Hx Diabetes


Cardiovascular History: Reports: Hx Aneurysm - brain aneurysm, Hx Angina - NONE 

SINCE 2003, Hx Coronary Artery Disease - CABG X5 2003, Hx Hypertension, Other 

Cardiovascular Problems/Disorders - CABG


   Denies: Hx Pacemaker/ICD


GI History: Reports: Hx Gastroesophageal Reflux Disease - WELL CONTROLLED


 History: Reports: Other  Problems/Disorders - PMhx UTI


   Denies: Hx Renal Disease


Sensory History: Reports: Hx Contacts or Glasses - GLASSES, Hx Hearing Aid - 

RIGHT EAR RARELY USES


Opthamlomology History: Reports: Hx Contacts or Glasses - GLASSES


Neurological History: Reports: Hx CVA, Hx Dementia, Other Neuro Impairments/

Disorders - DEMENTIA, subarachnoid hemorrhage


Psychiatric History: Reports: Other Psychiatric Issues/Disorders - dementia





- Cancer History


Cancer Type, Location and Year: skin cancer





- Surgical History


Surgery Procedure, Year, and Place: shunt-2008- [facility].  RIGHT TKR 2006 [facility].  

CABG X5 2003 [facility].  TONSILLECTOMY AS A CHILD


Hx Anesthesia Reactions: No





- Immunization History


Date of Tetanus Vaccine: 3/2013


Date of Influenza Vaccine: 9/2016


Infectious Disease History: Unable to Obtain/Confirm


Infectious Disease History: 


   Denies: Hx of Known/Suspected MRSA, Traveled Outside the US in Last 30 Days





- Family History


Known Family History: Positive: Unknown - LEVEL 5, unable to obtain, Other - 

Negative: malignant hyperthermia, anesthesia reaction





- Social History


Alcohol Use: None


Hx Substance Use: No


Substance Use Type: Reports: None


Hx Tobacco Use: Yes


Smoking Status (MU): Former Smoker


Type: Cigarettes


Amount Used/How Often: 1/2 PPD X 30 YEARS


Have You Smoked in the Last Year: No





Review of Systems


Positive: Fever, Chills


Negative: Nasal Discharge


Negative: Chest Pain


Negative: Abdominal Pain, Nausea


Negative: Headache


All Other Systems Reviewed And Are Negative: Yes





- Comments


Additional Review of Systems Comments: 


LEVEL 5 CAVEAT DUE DEMENTIA





Physical Exam





- Summary


Physical Exam Summary: 





Appearance: Well-appearing, Well-nourished, lying in bed comfortably


Skin: Warm, dry, no obvious rash


Eyes: sclera anicteric, no conjunctival pallor


ENT: mucous membranes moist, pharynx appears normal


Neck: Supple, nontender


Respiratory: Clear to auscultation, no signs of respiratory distress


Cardiovascular: Normal S1, S2. No murmurs. Normal distal pulses in tibial and 

radial bilaterally.


Abdomen: Soft, nontender, normal active bowel sounds present


Musculoskeletal: Normal, Strength/ROM Intact


Neurological: A&Ox3, awake and alert, mentation is normal, speech is fluent and 

appropriate


Psychiatric: affect is normal, does not appear anxious or depressed





Triage Information Reviewed: Yes


Vital Signs On Initial Exam: 


 Initial Vitals











Temp Pulse Resp BP Pulse Ox


 


 102.1 F   108   20   167/77   93 


 


 08/31/18 18:22  08/31/18 18:22  08/31/18 18:22  08/31/18 18:22  08/31/18 18:22











Vital Signs Reviewed: Yes





Diagnostics





- Vital Signs


 Vital Signs











  Temp Pulse Resp BP Pulse Ox


 


 08/31/18 18:30   102    93


 


 08/31/18 18:26   101   167/77  93


 


 08/31/18 18:22  102.1 F  108  20  167/77  93














- Laboratory


Lab Results: 


 Lab Results











  08/31/18 08/31/18 Range/Units





  19:09 19:09 


 


WBC  11.0 H   (3.5-10.8)  10^3/ul


 


RBC  5.14   (4.00-5.40)  10^6/ul


 


Hgb  15.2   (14.0-18.0)  g/dl


 


Hct  44   (42-52)  %


 


MCV  85   (80-94)  fL


 


MCH  30   (27-31)  pg


 


MCHC  35   (31-36)  g/dl


 


RDW  13   (10.5-15)  %


 


Plt Count  182   (150-450)  10^3/ul


 


MPV  7.5   (7.4-10.4)  um3


 


Neut % (Auto)  73.1   (38-83)  %


 


Lymph % (Auto)  15.3 L   (25-47)  %


 


Mono % (Auto)  10.2 H   (0-7)  %


 


Eos % (Auto)  0.6   (0-6)  %


 


Baso % (Auto)  0.8   (0-2)  %


 


Absolute Neuts (auto)  8.0 H   (1.5-7.7)  10^3/ul


 


Absolute Lymphs (auto)  1.7   (1.0-4.8)  10^3/ul


 


Absolute Monos (auto)  1.1 H   (0-0.8)  10^3/ul


 


Absolute Eos (auto)  0.1   (0-0.6)  10^3/ul


 


Absolute Basos (auto)  0.1   (0-0.2)  10^3/ul


 


Absolute Nucleated RBC  0   10^3/ul


 


Nucleated RBC %  0.2   


 


INR (Anticoag Therapy)   0.94  (0.77-1.02)  


 


APTT   26.8  (26.0-36.3)  seconds











Result Diagrams: 


 08/31/18 19:09





 08/31/18 19:09


Lab Statement: Any lab studies that have been ordered have been reviewed, and 

results considered in the medical decision making process.





- Radiology


  ** CXR


Xray Interpretation: No Acute Changes - Impression: no acute disease


Radiology Interpretation Completed By: ED Physician - Dr. Rush, pending 

official report





- EKG


  ** 19:19


Cardiac Rate: NL


EKG Rhythm: Sinus Rhythm


EKG Interpretation: NSR at 91 bpm with prolonged OR interval, no ischemic 

changes





Course/Dx





- Course


Course Of Treatment: This is an elderly man with dementia living in assisted 

living facility who presents with acute fever.  His history and physical exam 

and laboratory studies and imaging do not show any source of his infection.  

Given his age, debility due to dementia, and difficulty in evaluating him, I 

have elected to admit him for observation.  I will defer that he decision for 

entatic therapy to the hospitalist.





- Diagnoses


Provider Diagnoses: 


 Fever








- Provider Notifications


Discussed Care Of Patient With: Whitney Wilson


Time Discussed With Above Provider: 23:31


Instructed by Provider To: Admit As Inpatient





Discharge





- Sign-Out/Discharge


Documenting (check all that apply): Patient Departure





- Discharge Plan


Condition: Stable


Disposition: ADMITTED TO [facility]





- Billing Disposition and Condition


Condition: STABLE


Disposition: Admitted to [facility]





- Attestation Statements


Document Initiated by Jalenibe: Yes


Documenting Scribe: Keri Valdez


Provider For Whom Liban is Documenting (Include Credential): Nikolay Rush MD


Scribe Attestation: 


Keri ARZOLA, scribed for Nikolay Rush MD on 09/01/18 at 0103. 


Scribe Documentation Reviewed: Yes


Provider Attestation: 


The documentation as recorded by the jalenibKeri pearce accurately 

reflects the service I personally performed and the decisions made by me, 

Nikolay Rush MD

## 2018-09-01 VITALS — SYSTOLIC BLOOD PRESSURE: 126 MMHG | DIASTOLIC BLOOD PRESSURE: 55 MMHG

## 2018-09-01 NOTE — HP
CC:  Dr. Leone

 

HISTORY AND PHYSICAL:

 

DATE OF ADMISSION:  09/01/18

 

PRIMARY CARE PROVIDER:  Dr. Leone.

 

CHIEF COMPLAINT:  Rigors and fever.

 

HISTORY OF PRESENT ILLNESS:  Santo Dai is an 83-year-old male with history 
of dementia, who is a resident of an enhanced [facility] and who was brought in because of fevers.  He had a fever of over 
101 degrees when he presented to the emergency department.  There were also 
reported rigors in his residence it was noted.  Apparently, he had been in his 
usual state of health up to that point.  Unfortunately, he himself is a very 
poor historian due to his significant dementia.  He is verbal, but conversation 
with him at baseline is very limited.

 

He is going to be placed on overnight observation with a diagnosis of systemic 
inflammatory response syndrome.  So far, we have not been able to identify the 
source of possible infection.

 

PAST MEDICAL HISTORY:

1.  History of coronary artery disease, status post coronary artery bypass 
grafting.

2.  History of  shunt placement.

3.  History of dementia.

4.  Hypertension.

5.  Gastroesophageal reflux disease.

6.  History of traumatic brain injury.

7.  Hyperlipidemia.

8.  BPH.

9.  Subarachnoid hemorrhage.

10.  History of skin melanoma removed in the past.

11.  History of right total knee replacement in 2006.

12.  Tonsillectomy.

13.  His shunt placed was in 2008.

14.  Psoriasis.

 

MEDICATIONS AT HOME:  Include:

 

1.  Trazodone 100 mg at bedtime.

2.  Acetaminophen on a p.r.n. basis.

3.  Lipitor 20 mg q.p.m.

4.  Aspirin 81 mg daily.

5.  Hydrocortisone cream 2.5% one application b.i.d.

6.  Colace 100 mg b.i.d.

7.  Metamucil 0.4 mg b.i.d.

8.  Flomax 0.4 mg q.p.m.

9.  Guaifenesin on a p.r.n. basis.

10.  Amlodipine 2.5 mg q.p.m.

 

FAMILY HISTORY:  Unobtainable from the patient.

 

SOCIAL HISTORY:  Unobtainable from the patient.  The patient is a resident at 
[facility] with significant dementia.  As per his son 
present in the room, the patient ambulates with a rolling walker.

 

REVIEW OF SYSTEMS:  Please see history of present illness.  Otherwise 
unobtainable.

 

                               PHYSICAL EXAMINATION

 

GENERAL:  The patient is an 83-year-old male who appears somewhat upset and 
annoyed during the evaluation.  He is not really cooperative.  He is verbal.  
He does not really follow commands.

 

VITAL SIGNS:  Blood pressure of 143/52, heart rate of 65 and regular, 
respiratory rate 20, oxygen saturation 97% on room air, temperature is 98.6, 
maximum temperature was 102.1.

 

HEENT:  Head:  Atraumatic, normocephalic.  Eyes:  Pupils are equal, reactive to 
light and accommodation.  Oropharynx clear.  Mucosa moist.

 

NECK: Supple.  No JVD, no bruits bilaterally.

 

RESPIRATORY:  Clear to auscultation bilaterally.

 

CARDIOVASCULAR:  Regular rate and rhythm.  No murmur.

 

ABDOMEN:  Soft, nontender.  Bowel sounds are present in all 4 quadrants.

 

EXTREMITIES:  There is no edema.  Pulses +2 bilaterally.  There is no clubbing 
or cyanosis.

 

NEUROLOGIC:  On neuro evaluation, speech is clear.  Cranial nerves II through 
XII grossly intact.  Motor strength is 5/5 bilaterally.  Furthermore, neuro 
evaluation, there is no neck stiffness and no pain on neck palpation.

 

SKIN:  On evaluation of the skin, no ecchymotic areas or rashes noted.

 

PSYCHIATRIC EVALUATION:  The patient is oriented to self only.  Not very 
cooperative with our evaluation.

 

 LABORATORY DATA/DIAGNOSTIC STUDIES:  White blood cell count 11.3, hemoglobin 
of 15.2, hematocrit of 44, and platelets of 182.

 

Sodium was 135, potassium of 3.9, chloride 103, carbon dioxide 25, BUN 18, 
creatinine 0.82.  Liver function tests unremarkable.  C-reactive protein of 31. 
Troponin of 0.

 

Urinalysis was grossly unremarkable, although the urine appeared cloudy.

 

Influenza testing is pending at the time of dictation.

 

The patient's portable chest x-ray viewed by myself prior to the official 
radiologist report shows no infiltrates.

 

EKG showed normal sinus rhythm with heart rate of 91 beats per minute.  No 
specific ST changes in the anterolateral leads.  Comparing to an EKG from 
October 2017, changes are similar.

 

ASSESSMENT AND PLAN:

1.  At this point, the patient is in systemic inflammatory response syndrome, 
but there is no obvious source of infection.  We are still awaiting flu testing 
results.  For the time being, the patient is treated supportively with Tylenol 
and intravenous fluids, which are going to be continued.  Blood cultures were 
obtained. Due to no source of infection and the patient being at this point 
hemodynamically stable, the patient is going to be monitored without initiation 
of antibiotics.

2.  For DVT prophylaxis, the patient is going to be placed on heparin 
subcutaneously.

3.  The patient code status is do not resuscitate.  The patient's healthcare 
proxy is apparently the patient's son, who lives in [place].  At this point, 
it is approximately 1 a.m. in the morning, I will not call the patient's 
healthcare proxy until the morning and utilize the patient's outpatient do not 
resuscitate order for the next 24 hours if needed.

 

TIME SPENT:  Approximately 55 minutes was spent on admission of this patient, 
more than half of that time was spent face-to-face with the patient and the 
patient's son present in the room during the interview and physical exam.

 

 

 

[id number]/CPS #: [id number]

ROSA ISELA

## 2018-09-01 NOTE — RAD
Indication: Sepsis. Coronary artery disease with prior bypass. History of tobacco use.



Comparison: June 02, 2018



Technique: Upright AP 1910 hours



Report: Mild LEFT basilar atelectasis with volume loss attributable to cardiomegaly

without significant change. Small calcified granulomas at the periphery of the RIGHT

midlung zone without change. Negative for pleural effusion or pneumothorax. Median

sternotomy wires and mediastinal vascular clips. Unremarkable central pulmonary

vasculature. RIGHT side ventriculoperitoneal shunt catheter. 



IMPRESSION: 

#. Cardiomegaly without evidence for pulmonary edema. 

#.  Mild LEFT basilar atelectasis without significant change.



R0

## 2018-09-01 NOTE — DS
DISCHARGE SUMMARY:

 

DATE OF ADMISSION:  09/01/18

 

DISCHARGE OF DISCHARGE:  09/01/18

 

ADMITTING PROVIDER:  Whitney Wilson MD.

 

PRIMARY CARE PROVIDER:  Dr. Darion Leone.

 

ATTENDING PHYSICIAN ON DAY OF DISCHARGE:  Carter Montenegro MD.

 

CHIEF COMPLAINT:  Fever and shakes.

 

PRINCIPAL DIAGNOSES:  Fever, tachycardia, mild leukocytosis in the setting of 
possible viral illness, resolved without antibiotics.

 

HISTORY OF PRESENT ILLNESS AND HOSPITAL COURSE:  Santo Dai is an 83-year-old 
male with past medical history of cerebral aneurysm with subarachnoid 
hemorrhage 7 years ago s/p  shunt (2008), which has left him with severe 
dementia; hypertension; coronary artery disease, status post CABG; 
hyperlipidemia; BPH; melanoma, status post removal. He lives at the assisted 
living facility of [place], uses a walker at baseline, normally is not 
oriented to time or place at baseline.  [place] [facility] 
when he developed a fever of 101 degrees and there was concern for shakiness/
potential rigors.  He was not able to report any specific symptoms given his 
severe dementia.  He was noted to have a fever of 102.1, tachycardic to 108, 
mild leukocytosis of 11.0, but normal neutrophils, negative procalcitonin, and 
normal lactic acid.  He had a urinalysis, which was cloudy, but otherwise 
completely normal.  His CRP was 31.9.  He received sepsis fluid bolus and heart 
rate improved to 50s to 60s.  He got 975 mg of Tylenol and defervesced without 
any return of the fever. He improved without ever getting any empiric 
antibiotics. He had influenza swab that was negative and blood cultures have 
been drawn, not yet returned.  On evaluation today, he denies any dysuria, 
stomach pain, chest pain, shortness of breath, coughing, headaches, vision 
changes, neck stiffness.  His son, Catrachito, is at bedside and reports the patient 
is back to his baseline.  He does complain of some increased flatulence today. 
Physical exam is otherwise benign.  He is oriented to self only as per his 
baseline.  He has been discharged back to [facility] 
and we will need to follow up on his blood cultures, but it is suspected that 
he developed fever from a viral process.  No other changes to his medications 
were made.

 

DISCHARGE MEDICATIONS:  Include:

 

1.  Tylenol 500 mg p.o. q.4 hours p.r.n. and 500 mg p.o. at bedtime.

2.  Amlodipine 2.5 mg p.o. q.p.m.

3.  Atorvastatin 20 mg p.o. q.p.m.

4.  Guaifenesin 600 mg p.o. q.2 hours p.r.n.

5.  Hydrocortisone 2.5% cream b.i.d.

6.  Metamucil  p.o. b.i.d.

7.  Trazodone 100 mg p.o. at bedtime.

8.  Tamsulosin 0.4 mg p.o. q.p.m.

9.  Docusate 100 mg p.o. b.i.d.

10.  Aspirin 81 mg daily.

 

DISCHARGE DIET:  Heart healthy, unchanged.

 

ACTIVITY LEVEL:  No restrictions.  He uses a walker on occasion.

 

FOLLOWUP:  Please follow up with primary care provider, Dr. Darion Leone, 
within 7 days of discharge.  There are blood cultures drawn on admission that 
need to be followed. [Now no growth to date for 24 hours at time of this signing
].

 

TIME SPENT ON DISCHARGE:  30 minutes.

 

 [id number]/CPS #: [id number]

MTDD

## 2019-01-19 ENCOUNTER — HOSPITAL ENCOUNTER (EMERGENCY)
Dept: HOSPITAL 25 - ED | Age: 84
Discharge: HOME | End: 2019-01-19
Payer: COMMERCIAL

## 2019-01-19 VITALS — SYSTOLIC BLOOD PRESSURE: 152 MMHG | DIASTOLIC BLOOD PRESSURE: 78 MMHG

## 2019-01-19 DIAGNOSIS — Z95.1: ICD-10-CM

## 2019-01-19 DIAGNOSIS — I25.119: ICD-10-CM

## 2019-01-19 DIAGNOSIS — Z87.891: ICD-10-CM

## 2019-01-19 DIAGNOSIS — F03.90: Primary | ICD-10-CM

## 2019-01-19 DIAGNOSIS — Z88.8: ICD-10-CM

## 2019-01-19 DIAGNOSIS — G93.89: ICD-10-CM

## 2019-01-19 LAB
ALBUMIN SERPL BCG-MCNC: 3.4 G/DL (ref 3.2–5.2)
ALBUMIN/GLOB SERPL: 1.3 {RATIO} (ref 1–3)
ALP SERPL-CCNC: 91 U/L (ref 34–104)
ALT SERPL W P-5'-P-CCNC: 24 U/L (ref 7–52)
ANION GAP SERPL CALC-SCNC: 4 MMOL/L (ref 2–11)
AST SERPL-CCNC: 17 U/L (ref 13–39)
BASOPHILS # BLD AUTO: 0.1 10^3/UL (ref 0–0.2)
BUN SERPL-MCNC: 14 MG/DL (ref 6–24)
BUN/CREAT SERPL: 16.3 (ref 8–20)
CALCIUM SERPL-MCNC: 9.3 MG/DL (ref 8.6–10.3)
CHLORIDE SERPL-SCNC: 105 MMOL/L (ref 101–111)
EOSINOPHIL # BLD AUTO: 0.2 10^3/UL (ref 0–0.6)
GLOBULIN SER CALC-MCNC: 2.6 G/DL (ref 2–4)
GLUCOSE SERPL-MCNC: 114 MG/DL (ref 70–100)
HCO3 SERPL-SCNC: 28 MMOL/L (ref 22–32)
HCT VFR BLD AUTO: 41 % (ref 42–52)
HGB BLD-MCNC: 13.6 G/DL (ref 14–18)
LYMPHOCYTES # BLD AUTO: 2.8 10^3/UL (ref 1–4.8)
MCH RBC QN AUTO: 29 PG (ref 27–31)
MCHC RBC AUTO-ENTMCNC: 34 G/DL (ref 31–36)
MCV RBC AUTO: 87 FL (ref 80–94)
MONOCYTES # BLD AUTO: 0.5 10^3/UL (ref 0–0.8)
NEUTROPHILS # BLD AUTO: 3.8 10^3/UL (ref 1.5–7.7)
NRBC # BLD AUTO: 0 10^3/UL
NRBC BLD QL AUTO: 0.1
PLATELET # BLD AUTO: 240 10^3/UL (ref 150–450)
POTASSIUM SERPL-SCNC: 4.1 MMOL/L (ref 3.5–5)
PROT SERPL-MCNC: 6 G/DL (ref 6.4–8.9)
RBC # BLD AUTO: 4.68 10^6/UL (ref 4–5.4)
SODIUM SERPL-SCNC: 137 MMOL/L (ref 135–145)
TROPONIN I SERPL-MCNC: 0 NG/ML (ref ?–0.04)
WBC # BLD AUTO: 7.4 10^3/UL (ref 3.5–10.8)

## 2019-01-19 PROCEDURE — 70450 CT HEAD/BRAIN W/O DYE: CPT

## 2019-01-19 PROCEDURE — 99283 EMERGENCY DEPT VISIT LOW MDM: CPT

## 2019-01-19 PROCEDURE — 85025 COMPLETE CBC W/AUTO DIFF WBC: CPT

## 2019-01-19 PROCEDURE — 71045 X-RAY EXAM CHEST 1 VIEW: CPT

## 2019-01-19 PROCEDURE — 36415 COLL VENOUS BLD VENIPUNCTURE: CPT

## 2019-01-19 PROCEDURE — 84484 ASSAY OF TROPONIN QUANT: CPT

## 2019-01-19 PROCEDURE — 80053 COMPREHEN METABOLIC PANEL: CPT

## 2019-01-19 PROCEDURE — 81003 URINALYSIS AUTO W/O SCOPE: CPT

## 2019-01-19 NOTE — ED
Altered Mental Status





- HPI Summary


HPI Summary: 





Pt is a 82 y/o M presenting to the ED brought in by EMS for agitation and 

aggressiveness. LEVEL 5 CAVEAT: The pts full HPI, history, and physical are 

limited due to the pt having dementia.





- History Of Current Complaint


Chief Complaint: EDGeneral


Stated Complaint: GENERAL ILLNESS/STAFF REQUESTED EVAL


Time Seen by Provider: 01/19/19 10:56


Hx Obtained From: EMS


Hx From Patient Unobtainable Due To: Dementia


Onset/Duration: Unknown


Timing: Lasting Hours


Severity Initially: Mild


Severity Currently: None


Character: Confusion, Lethargy


Aggravating Factor(s): Nothing


Alleviating Factor(s): Nothing


Associated Signs And Symptoms: Positive: Negative


Related History: Other: - dementia





- Allergies/Home Medications


Allergies/Adverse Reactions: 


 Allergies











Allergy/AdvReac Type Severity Reaction Status Date / Time


 


haloperidol [From Haldol] Allergy  Unknown Verified 08/31/18 18:44





   Reaction  





   Details  














PMH/Surg Hx/FS Hx/Imm Hx


Previously Healthy: No


Endocrine/Hematology History: 


   Denies: Hx Diabetes


Cardiovascular History: Reports: Hx Aneurysm - brain aneurysm, Hx Angina - NONE 

SINCE 2003, Hx Coronary Artery Disease - CABG X5 2003, Hx Hypertension, Other 

Cardiovascular Problems/Disorders - CABG


   Denies: Hx Pacemaker/ICD


GI History: Reports: Hx Gastroesophageal Reflux Disease - WELL CONTROLLED


 History: Reports: Other  Problems/Disorders - PMhx UTI


   Denies: Hx Renal Disease


Sensory History: Reports: Hx Contacts or Glasses - GLASSES, Hx Hearing Aid - 

RIGHT EAR RARELY USES


Opthamlomology History: Reports: Hx Contacts or Glasses - GLASSES


Neurological History: Reports: Hx CVA, Hx Dementia, Other Neuro Impairments/

Disorders - DEMENTIA, subarachnoid hemorrhage


Psychiatric History: Reports: Other Psychiatric Issues/Disorders - dementia





- Cancer History


Cancer Type, Location and Year: skin cancer





- Surgical History


Surgery Procedure, Year, and Place: shunt-2008- [facility].  RIGHT TKR 2006 [facility].  

CABG X5 2003 [facility].  TONSILLECTOMY AS A CHILD


Hx Anesthesia Reactions: No





- Immunization History


Date of Tetanus Vaccine: 3/2013


Date of Influenza Vaccine: 9/2016


Infectious Disease History: No


Infectious Disease History: 


   Denies: Hx of Known/Suspected MRSA, Traveled Outside the [place] in Last 30 Days





- Family History


Known Family History: Positive: Unknown - LEVEL 5, unable to obtain, Other - 

Negative: malignant hyperthermia, anesthesia reaction





- Social History


Alcohol Use: unable to obtain this info d/t the patient's condition


Hx Substance Use: No


Substance Use Type: Reports: None


Hx Tobacco Use: Yes


Smoking Status (MU): Former Smoker


Type: Cigarettes


Amount Used/How Often: 1/2 PPD X 30 YEARS


Have You Smoked in the Last Year: No





Review of Systems


Negative: Fever


Positive: Other - agitation and confusion


All Other Systems Reviewed And Are Negative: Yes





Physical Exam





- Summary


Physical Exam Summary: 


Appearance: Well appearing, no pain distress


Skin: warm, dry, reflects adequate perfusion


Head/face: normal


Eyes: EOMI, JAMES


ENT: normal


Neck: supple, non-tender


Respiratory: CTA, breath sounds present


Cardiovascular: RRR, pulses symmetrical  


Abdomen: non-tender, soft


Musculoskeletal: normal, strength/ROM intact


Neuro: sensory motor intact, alert but confused


GCS: 13








Triage Information Reviewed: Yes


Vital Signs On Initial Exam: 


 Initial Vitals











Temp Pulse Resp BP Pulse Ox


 


 97.6 F   68   18   156/71   97 


 


 01/19/19 10:50  01/19/19 10:50  01/19/19 10:50  01/19/19 10:50  01/19/19 10:50











Vital Signs Reviewed: Yes


Completion Of Physical Exam Limited Due To: Dementia





Diagnostics





- Vital Signs


 Vital Signs











  Temp Pulse Resp BP Pulse Ox


 


 01/19/19 10:50  97.6 F  68  18  156/71  97














- Laboratory


Result Diagrams: 


 01/19/19 11:23





 01/19/19 11:22


Lab Statement: Any lab studies that have been ordered have been reviewed, and 

results considered in the medical decision making process.





- Radiology


  ** Chest xray


Radiology Interpretation Completed By: Radiologist


Summary of Radiographic Findings: No active cardiopulmonary disease.  ED 

physician has reviewed this report.





- CT


  ** Brain CT


CT Interpretation Completed By: Radiologist


Summary of CT Findings: No acute intracranial pathology. Stable ventriculomegaly

, status post  shunting.  ED physician has reviewed this report.





Altered Mental Statu Course/Dx





- Course


Course Of Treatment: MDM: The pt is an 82 y/o M presenting to the ED brought in 

by EMS for agitation. Due to his dementia, the pt did not give a full story or 

medical history. Bloodwork/UA obtained in the ED. A brain CT shows no acute 

intracranial pathology, and stable ventriculomegaly, status post  shunting, 

and a chest x-ray shows no active cardiopulmonary disease. Final dx includes 

dementia. Pt was discharged back to the nursing home with instructions to 

follow up with his PCP and return to the ED with any new or worsening symptoms. 

Pt's wife was agreeable with this plan.





- Diagnoses


Differential Diagnosis/HQI/PQRI: Hypoglycemia, Intracranial Bleed, Sepsis


Provider Diagnoses: 


 Dementia








Discharge





- Sign-Out/Discharge


Documenting (check all that apply): Patient Departure





- Discharge Plan


Condition: Stable


Disposition: HOME


Referrals: 


Darion Leone MD [Primary Care Provider] - 


Additional Instructions: 


PLEASE FOLLOW UP WITH YOUR PRIMARY CARE PROVIDER WITHIN THE NEXT THREE DAYS.





RETURN TO THE EMERGENCY DEPARTMENT WITH ANY NEW OR WORSENING SYMPTOMS.





- Billing Disposition and Condition


Condition: STABLE


Disposition: Home





- Attestation Statements


Document Initiated by Liban: Yes


Documenting Scribe: Venus Stover


Provider For Whom Liban is Documenting (Include Credential): Ry Brand MD.


Scribe Attestation: 


Venus ARZOLA scribed for Ry Brand MD. on 01/19/19 at 1321. 


Scribe Documentation Reviewed: Yes


Provider Attestation: 


The documentation as recorded by the Venus castro accurately 

reflects the service I personally performed and the decisions made by Ry loco MD.


Status of Scribe Document: Viewed

## 2019-01-19 NOTE — XMS REPORT
Continuity of Care Document (CCD)

 Created on:January 10, 2019



Patient:Jesús Dai

Sex:Male

:1935

External Reference #:[id number]





Demographics







 Address  [address] [place] [address]

 

 Home Phone  [phone]

 

 Mobile Phone  [phone]

 

 Email Address  [email or web address]

 

 Preferred Language  en

 

 Marital Status  Not  or 

 

 Buddhism Affiliation  Unknown

 

 Race  White

 

 Ethnic Group  Not  or 









Author







 Name  Nadia Wong NP

 

 Address  [address] [facility] [facility] [address]



   Unavailable



   [place] [address]









Support







 Name  Relationship  Address  Phone

 

 Antonella Dai  Wife  Unavailable  [phone]









Care Team Providers







 Name  Role  Phone

 

 Darion Leone MD  Care Team Information   Unavailable

 

 Darion Leone MD  Primary Care Physician  Unavailable









Payers







 Type  Date  Identification Numbers  Payment Provider  Subscriber

 

   Effective: 2014  Policy Number: [id number]  BC/ Of JENI  Antonella Dai









 Group Name: [facility]  [address] [address]

 

 PayID: [id number]  [place] [address]







Advance Directives







 Description

 

 No Information Available







Problems







 Date  Description  Provider  Status

 

 Onset: 2016  Late effect of intracranial injury  Darion Leone M.D.  
Active



   without skull fracture    

 

 Onset: 2016  Multi-infarct dementia, uncomplicated  Darion Leone M.D.  Active

 

 Onset: 2016  Major depressive disorder, single  Darion Leone M.D.  
Active



   episode, unspecified    

 

 Onset: 10/01/2015  Essential hypertension  Darion Leone M.D.  Active

 

 Onset: 2014  Coronary arteriosclerosis  Darion Leone M.D.  Active

 

 Onset: 2014  Benign prostatic hypertrophy without  Darion Leone M.D.
  Active



   outflow obstruction    

 

 Onset: 2014  Gastroesophageal reflux disease  Darion Leone M.D.  
Active







Family History







 Date  Family Member(s)  Problem(s)  Comments

 

   Father  Heart Disease  







Social History







 Type  Date  Description  Comments

 

 Birth Sex    Unknown  

 

 Tobacco Use  Start: Unknown End: Unknown  Patient is a former smoker  

 

 Smoking Status  Reviewed: 17  Patient is a former smoker  







Allergies, Adverse Reactions, Alerts







 Date  Description  Reaction  Status  Severity  Comments

 

 2014  Haldol  meanness  Active    

 

 2014  NKDA    Inactive    







Medications







 Medication  Date  Status  Form  Strength  Qnty  SIG  Indications  Ordering



                 Provider

 

 Econazole Nitrate    Active  Cream  1%  30uni  Apply 1-2            ts  Times    Darlow,



             Every Day    M.D.



             To    



             Affected    



             Areas For    



             Up To 4    



             Weeks    

 

 Amlodipine Besylate    Active  Tablets  2.5mg  90tab  Take 1 By  I10  
        s  Mouth    Darlow,



             Every Day    M.D.

 

 Metamucil    Active  Powder  0.4mg    1 cap              every    Darlow,



             morning @    M.D.



             6am    

 

 Colace  12/15  Active  Capsules  100mg  60cap  1 capsule  K59.00          s  bid    Darlow,



                 M.D.

 

 Hydrocortisone    Active  Cream  2.5%  28.35  Apply To            units  Both    Darlow,



             Elbows    M.D.



             And Feet    



             Twice    



             Daily    

 

 Acetaminophen Extra    Active  Tablets  500mg  50tab  1 by    Darion A.



 Strength  /        s  mouth q 4    Darlow,



             hours as    M.D.



             needed    



             for pain    



             or temp    



             greater    



             than    



             100F.    



             patient    



             may    



             request;    



             1 po qhs    

 

 Aspir-Low    Active  Tablets  81mg  30tab  1 po qd    Unknown



   /0000    DR donte Nelsonesin ER    Active  Tablets  600mg  20tab  1 po q 12    Unknown



   /0000    ER 12HR    s  hrs prn    

 

 Ensure Complete    Active  Liquid      1 can po    Unknown



 Nutrition Shake  /0000          at 7 pm    

 

 Aquafor Ointment    Active        apply to    Unknown



   /0000          both    



             arms,    



             head, and    



             face    



             twice    



             daily    

 

 Tamsulosin HCL    Active  Capsules  0.4mg  30cap  Take 1    Darion A.



           s  Capsule    Darlow,



             By Mouth    M.D.



             Once    



             Daily    

 

 Atorvastatin Calcium    Active  Tablets  20mg  30tab  Take 1    Darion A.



   /        s  Tablet By    Darlow,



             Mouth    M.D.



             Once    



             Daily    

 

 Trazodone HCL    Active  Tablets  100mg  30tab  Take 1    Darion A.



   /        s  Tab By    Darlow,



             Mouth    M.D.



             Every    



             Night    

 

                 

 

 Methylphenidate HCL  10/22  Hx  Tablets  10mg  60tab  1 by    Darion        s  mouth two    Darlow,



   -          times a    M.D.



   01/10          day as    



   /2019          directed,    



             at 0800    



             and 1300    

 

 Lisinopril    Hx  Tablets  5mg  90tab  1 by  I10  Darion PAVON



           s  mouth    Sulema,



   -          every day    M.D.



                 

 

 Econazole Nitrate    Hx  Cream  1%  30uni  apply 1-2    Darion PAVON



           ts  times    Sulema,



   -          every day    M.D.



             to    



             affected    



             areas for    



             up to 4    



             weeks    

 

 Metamucil    Hx  Powder      1 cap    Valentina



   /          every    Sierra



   -          morning @    [place],



             6am    NP



   /              

 

 Labs    Hx        p33 Dx:  R63.0  Darion A.



             anorexia    Sulema,



   -          cbc, U/a    M.D.



             dx: alt.    



             mental    



             status    

 

 Bupropion HCL ER    Hx  Tablets  150mg  30tab  1 by  F02.81  Pennie



 ()      ER 24HR    s  mouth    Mima,



   -          every day    FNP



                 

 

 Bupropion HCL ER    Hx  Tablets  300mg  30tab  Take 1    Darion PAVON



 (XL)      ER 24HR    s  Tablet By    Sulema,



   -          Mouth    M.D.



   03/11          Daily    



   /2016              

 

 Bupropion HCL ER    Hx  Tablets  150mg  90tab  1 by    Darion PAVON



 (XL)      ER 24HR    s  mouth    Sulema,



   -          every day    M.D.



                 

 

 Methylphenidate HCL    Hx  Tablets  10mg  60tab  1 by    Darion PAVON



           s  mouth    Sulema,



   -          twice a    M.D.



             day, at    



             8am and    



             12pm    

 

 Hydrochlorothiazide    Hx  Tablets  12.5mg  30tab  1 Tab By    Darion PAVON



           s  Mouth    Sulema,



   -          Every Day    M.D.



                 

 

 Ceftin    Hx  Tablets  500mg  20tab  1 by  461.0          s  mouth    Simone,



   -          twice a    M.D.



             day x 10    



   /2015          days    

 

 Tamiflu    Hx  Capsules  75mg  10cap  1 by    Darion PAVON



           s  mouth    Sulema,



   -          every day    M.D.



   02/07          x 10 days    



   /2015              

 

 Omeprazole    Hx  Capsules  20mg  30cap  1 Cap By    Darion A.



   /0000    DR donte Leone,



   -          Every Day    M.D.



                 







Immunizations







 CPT Code  Status  Date  Vaccine  Lot #

 

 [id number]  Given  2016  Influenza Vac, Quadrivalent, Slit Virus, Im  

 

 [id number]  Given  10/01/2015  Influenza Vac, Quadrivalent, Slit Virus, Im  

 

 [id number]  Given  10/29/2013  DO Not Use Split Influenza Virus Vaccine  

 

 [id number]  Given  10/24/2012  DO Not Use Split Influenza Virus Vaccine  

 

 [id number]  Given  10/17/2011  DO Not Use Split Influenza Virus Vaccine  

 

 [id number]  Given  10/01/2003  Pneumococcal Conjugate Vacc-13  

 

 [id number]  Given  10/01/1998  Pneumococcal Conjugate Vacc-13  







Vital Signs







 Date  Vital  Result  Comment

 

 01/10/2019  6:16pm  BP Systolic  120 mmHg  









 BP Diastolic  50 mmHg  

 

 Heart Rate  100 /min  

 

 Body Temperature  99.8 F  

 

 O2 % BldC Oximetry  94 %  









 2018  3:37pm  BP Systolic  142 mmHg  









 BP Diastolic  80 mmHg  

 

 Heart Rate  76 /min  

 

 Body Temperature  99.8 F  

 

 Respiratory Rate  16 /min  

 

 Height  71.5 inches  5'11.50"

 

 Weight  212.00 lb  

 

 BMI (Body Mass Index)  29.2 kg/m2  









 2018  4:35pm  BP Systolic  136 mmHg  









 BP Diastolic  72 mmHg  

 

 Heart Rate  72 /min  

 

 Body Temperature  98.5 F  

 

 Respiratory Rate  16 /min  

 

 Height  71.5 inches  5'11.50"

 

 Weight  210.00 lb  

 

 BMI (Body Mass Index)  28.9 kg/m2  









 2018  4:38pm  BP Systolic  136 mmHg  









 BP Diastolic  68 mmHg  

 

 Heart Rate  76 /min  

 

 Body Temperature  98.6 F  

 

 Respiratory Rate  16 /min  

 

 Height  71.5 inches  5'11.50"

 

 Weight  204.00 lb  

 

 BMI (Body Mass Index)  28.1 kg/m2  









 2017  3:34pm  BP Systolic  152 mmHg  









 BP Diastolic  80 mmHg  

 

 Heart Rate  96 /min  

 

 Body Temperature  98.8 F  

 

 Height  71.5 inches  5'11.50"

 

 Weight  203.50 lb  

 

 BMI (Body Mass Index)  28.0 kg/m2  









 2017  2:42pm  BP Systolic  130 mmHg  









 BP Diastolic  62 mmHg  

 

 Heart Rate  80 /min  

 

 Respiratory Rate  16 /min  

 

 Height  71.5 inches  5'11.50"

 

 Weight  203.00 lb  

 

 BMI (Body Mass Index)  27.9 kg/m2  









 06/15/2017  5:30pm  BP Systolic  132 mmHg  









 BP Diastolic  80 mmHg  

 

 Heart Rate  76 /min  

 

 Body Temperature  98.9 F  

 

 Respiratory Rate  16 /min  

 

 Height  71.5 inches  5'11.50"

 

 Weight  206.38 lb  

 

 BMI (Body Mass Index)  28.4 kg/m2  









 2017  3:45pm  BP Systolic  140 mmHg  









 BP Diastolic  70 mmHg  

 

 Heart Rate  118 /min  

 

 Body Temperature  97.9 F  

 

 Height  71.5 inches  5'11.50"

 

 Weight  200.00 lb  

 

 BMI (Body Mass Index)  27.5 kg/m2  









 2017  5:23pm  BP Systolic  148 mmHg  









 BP Diastolic  70 mmHg  

 

 Heart Rate  96 /min  

 

 Body Temperature  97.9 F  

 

 Respiratory Rate  16 /min  

 

 Height  71.5 inches  5'11.50"

 

 Weight  203.00 lb  

 

 BMI (Body Mass Index)  27.9 kg/m2  









 12/15/2016  3:30pm  BP Systolic  154 mmHg  









 BP Diastolic  80 mmHg  

 

 Heart Rate  90 /min  

 

 Body Temperature  97.7 F  

 

 Height  71.5 inches  5'11.50"

 

 Weight  206.00 lb  

 

 BMI (Body Mass Index)  28.3 kg/m2  









 2016  2:36pm  BP Systolic  140 mmHg  









 BP Diastolic  80 mmHg  

 

 Heart Rate  72 /min  

 

 Body Temperature  98.4 F  

 

 Respiratory Rate  16 /min  

 

 Height  71.5 inches  5'11.50"

 

 Weight  200.00 lb  

 

 BMI (Body Mass Index)  27.5 kg/m2  









 2016  4:38pm  BP Systolic  128 mmHg  









 BP Diastolic  70 mmHg  

 

 Heart Rate  80 /min  

 

 Body Temperature  98.4 F  

 

 Respiratory Rate  16 /min  

 

 Height  70.25 inches  5'10.25"

 

 Weight  199.00 lb  

 

 BMI (Body Mass Index)  28.3 kg/m2  









 2016 10:11am  BP Systolic  132 mmHg  









 BP Diastolic  68 mmHg  

 

 Heart Rate  80 /min  

 

 Body Temperature  97.9 F  

 

 Respiratory Rate  16 /min  

 

 Height  70.25 inches  5'10.25"

 

 Weight  200.00 lb  

 

 BMI (Body Mass Index)  28.5 kg/m2  









 2016  4:36pm  BP Systolic  140 mmHg  









 BP Diastolic  70 mmHg  

 

 Heart Rate  80 /min  

 

 Body Temperature  99.0 F  

 

 Respiratory Rate  16 /min  

 

 Height  70.25 inches  5'10.25"

 

 Weight  203.00 lb  

 

 BMI (Body Mass Index)  28.9 kg/m2  









 10/01/2015  2:59pm  BP Systolic  150 mmHg  









 BP Diastolic  70 mmHg  

 

 Heart Rate  76 /min  

 

 Body Temperature  98.4 F  

 

 Respiratory Rate  24 /min  

 

 Height  70.25 inches  5'10.25"

 

 Weight  212.00 lb  

 

 BMI (Body Mass Index)  30.2 kg/m2  









 2015  3:43pm  BP Systolic  140 mmHg  









 BP Diastolic  80 mmHg  

 

 Heart Rate  80 /min  

 

 Body Temperature  99.3 F  

 

 Respiratory Rate  16 /min  

 

 Height  70.25 inches  5'10.25"

 

 Weight  208.00 lb  

 

 BMI (Body Mass Index)  29.6 kg/m2  









 2015  4:08pm  BP Systolic  144 mmHg  









 BP Diastolic  60 mmHg  

 

 Heart Rate  78 /min  

 

 Body Temperature  98.6 F  

 

 Respiratory Rate  16 /min  

 

 Height  70.25 inches  5'10.25"

 

 Weight  208.38 lb  

 

 BMI (Body Mass Index)  29.7 kg/m2  









 2015  4:37pm  BP Systolic  164 mmHg  









 BP Diastolic  70 mmHg  

 

 Heart Rate  84 /min  

 

 Body Temperature  99.1 F  

 

 Height  70.25 inches  5'10.25"









 2015  4:18pm  BP Systolic  164 mmHg  









 BP Diastolic  70 mmHg  

 

 Heart Rate  76 /min  

 

 Body Temperature  98.7 F  

 

 Respiratory Rate  12 /min  

 

 Height  70.25 inches  5'10.25"

 

 Weight  211.00 lb  

 

 BMI (Body Mass Index)  30.1 kg/m2  









 2015  4:12pm  BP Systolic  150 mmHg  









 BP Diastolic  64 mmHg  

 

 Heart Rate  76 /min  

 

 Body Temperature  98.7 F  

 

 Respiratory Rate  16 /min  

 

 Height  70.25 inches  5'10.25"

 

 Weight  211.00 lb  

 

 BMI (Body Mass Index)  30.1 kg/m2  









 10/07/2014 10:10am  BP Systolic  130 mmHg  









 BP Diastolic  60 mmHg  

 

 Heart Rate  84 /min  

 

 Body Temperature  97.7 F  

 

 Respiratory Rate  15 /min  

 

 Height  70.25 inches  5'10.25"

 

 Weight  208.00 lb  

 

 BMI (Body Mass Index)  29.6 kg/m2  









 2014 10:24am  BP Systolic  122 mmHg  









 BP Diastolic  70 mmHg  

 

 Heart Rate  88 /min  

 

 Body Temperature  98.4 F  

 

 Respiratory Rate  16 /min  

 

 Height  70.25 inches  5'10.25"

 

 Weight  207.00 lb  

 

 BMI (Body Mass Index)  29.5 kg/m2  







Results







 Test  Date  Facility  Test  Result  H/L  Range  Note

 

 Urinalysis Profile  2018  [facility]  Urine Color  Yellow      1









 Urine Appearance  Clear      

 

 Urine Specific Gravity  1.012  N  1.010-1.030  

 

 Urine pH  6.0  N  5-9  

 

 Urine Urobilinogen  Negative    Negative  

 

 Urine Ketones  Negative    Negative  

 

 Urine Protein  Negative    Negative  

 

 Urine Leukocytes  Negative    Negative  

 

 Urine Blood  Negative    Negative  

 

 *  *  Abnormal  Negative  2

 

 Urine Nitrite  Negative    Negative  

 

 Urine Bilirubin  Negative    Negative  

 

 Urine Glucose  2+(150 mg/dL)  Abnormal  Negative  









 Urine Culture And  2018  [facility]  Urine Culture  SEE RESULT      3



 Sensitivities        BELOW      

 

 Laboratory test finding  2018  [facility]  Lactic Acid  0.9 mmol/L  N  0.5-2.0  
4

 

 CBC Auto Diff  2018  [facility]  White Blood  11.0 10^3/uL  High  3.5-10.8  



       Count        









 Red Blood Count  5.14 10^6/uL  N  4.00-5.40  

 

 Hemoglobin  15.2 g/dL  N  14.0-18.0  

 

 Hematocrit  44 %  N  42-52  

 

 Mean Corpuscular Volume  85 fL  N  80-94  

 

 Mean Corpuscular Hemoglobin  30 pg  N  27-31  

 

 Mean Corpuscular HGB Conc  35 g/dL  N  31-36  

 

 Red Cell Distribution Width  13 %  N  10.5-15  

 

 Platelet Count  182 10^3/uL  N  150-450  

 

 Mean Platelet Volume  7.5 um3  N  7.4-10.4  

 

 Abs Neutrophils  8.0 10^3/uL  High  1.5-7.7  

 

 Abs Lymphocytes  1.7 10^3/uL  N  1.0-4.8  

 

 Abs Monocytes  1.1 10^3/uL  High  0-0.8  

 

 Abs Eosinophils  0.1 10^3/uL  N  0-0.6  

 

 Abs Basophils  0.1 10^3/uL  N  0-0.2  

 

 Abs Nucleated RBC  0 10^3/uL      

 

 Granulocyte %  73.1 %  N  38-83  

 

 Lymphocyte %  15.3 %  Low  25-47  

 

 Monocyte %  10.2 %  High  0-7  

 

 Eosinophil %  0.6 %  N  0-6  

 

 Basophil %  0.8 %  N  0-2  

 

 Nucleated Red Blood Cells %  0.2      









 Inr/Protime  2018  [facility]  Inr  0.94  N  0.77-1.02  

 

 Laboratory test finding  2018  [facility]  Partial Thrombo  26.8 seconds  N  
26.0-36.3  



       Time PTT        









 Lactic Acid  1.4 mmol/L  N  0.5-2.0  5









 Comp Metabolic Panel  2018  [facility]  Sodium  135 mmol/L  N  135-145  









 Potassium  3.9 mmol/L  N  3.5-5.0  

 

 Chloride  103 mmol/L  N  101-111  

 

 Co2 Carbon Dioxide  25 mmol/L  N  22-32  

 

 Anion Gap  7 mmol/L  N  2-11  

 

 Glucose  126 mg/dL  High    

 

 Blood Urea Nitrogen  18 mg/dL  N  6-24  

 

 Creatinine  0.82 mg/dL  N  0.67-1.17  

 

 BUN/Creatinine Ratio  22.0  High  8-20  

 

 Calcium  9.3 mg/dL  N  8.6-10.3  

 

 Total Protein  6.4 g/dL  N  6.4-8.9  

 

 Albumin  3.9 g/dL  N  3.2-5.2  

 

 Globulin  2.5 g/dL  N  2-4  

 

 Albumin/Globulin Ratio  1.6  N  1-3  

 

 Total Bilirubin  0.70 mg/dL  N  0.2-1.0  

 

 Alkaline Phosphatase  84 U/L  N    

 

 Alt  17 U/L  N  7-52  

 

 Ast  11 U/L  Low  13-39  

 

 Egfr Non-  89.9    >60  

 

 Egfr   108.8    >60  6









 Laboratory test finding  2018  [facility]  Troponin I  0.00 ng/mL    <0.04  

 

 Urinalysis Profile  2018  [facility]  Urine Color  Yellow      









 Urine Appearance  Cloudy      

 

 Urine Specific Gravity  1.018  N  1.010-1.030  

 

 Urine pH  7.0  N  5-9  

 

 Urine Urobilinogen  Negative    Negative  

 

 Urine Ketones  Negative    Negative  

 

 Urine Protein  Negative    Negative  

 

 Urine Leukocytes  Negative    Negative  

 

 Urine Blood  Negative    Negative  

 

 Urine Nitrite  Negative    Negative  

 

 Urine Bilirubin  Negative    Negative  

 

 Urine Glucose  Negative    Negative  









 Laboratory test finding  2018  [facility]  C Reactive Protein  31.98 mg/L  High
  <8.01  









 Procalcitonin  < 0.1 ng/mL    <0.6  7

 

 Blood Culture  SEE RESULT BELOW      8









 CBC Auto Diff  2018  [facility]  White Blood Count  8.3 10^3/uL  N  3.5-10.8  









 Red Blood Count  4.86 10^6/uL  N  4.0-5.4  

 

 Hemoglobin  14.3 g/dL  N  14.0-18.0  

 

 Hematocrit  41 %  Low  42-52  

 

 Mean Corpuscular Volume  85 fL  N  80-94  

 

 Mean Corpuscular Hemoglobin  29 pg  N  27-31  

 

 Mean Corpuscular HGB Conc  35 g/dL  N  31-36  

 

 Red Cell Distribution Width  13 %  N  10.5-15  

 

 Platelet Count  165 10^3/uL  N  150-450  

 

 Mean Platelet Volume  7.5 um3  N  7.4-10.4  

 

 Abs Neutrophils  5.2 10^3/uL  N  1.5-7.7  

 

 Abs Lymphocytes  1.8 10^3/uL  N  1.0-4.8  

 

 Abs Monocytes  1.0 10^3/uL  High  0-0.8  

 

 Abs Eosinophils  0.2 10^3/uL  N  0-0.6  

 

 Abs Basophils  0.1 10^3/uL  N  0-0.2  

 

 Abs Nucleated RBC  0 10^3/uL      

 

 Granulocyte %  62.9 %  N  38-83  

 

 Lymphocyte %  21.1 %  Low  25-47  

 

 Monocyte %  12.5 %  High  0-7  

 

 Eosinophil %  2.4 %  N  0-6  

 

 Basophil %  1.1 %  N  0-2  

 

 Nucleated Red Blood Cells %  0.1      









 Laboratory test finding  2018  [facility]  Lactic Acid  0.7 mmol/L  N  0.5-2.0  
9

 

 Comp Metabolic Panel  2018  [facility]  Sodium  134 mmol/L  Low  139-145  









 Potassium  4.1 mmol/L  N  3.5-5.0  

 

 Chloride  102 mmol/L  N  101-111  

 

 Co2 Carbon Dioxide  26 mmol/L  N  22-32  

 

 Anion Gap  6 mmol/L  N  2-11  

 

 Glucose  95 mg/dL  N    

 

 Blood Urea Nitrogen  16 mg/dL  N  6-24  

 

 Creatinine  0.92 mg/dL  N  0.67-1.17  

 

 BUN/Creatinine Ratio  17.4  N  8-20  

 

 Calcium  9.0 mg/dL  N  8.6-10.3  

 

 Total Protein  6.3 g/dL  Low  6.4-8.9  

 

 Albumin  3.8 g/dL  N  3.2-5.2  

 

 Globulin  2.5 g/dL  N  2-4  

 

 Albumin/Globulin Ratio  1.5  N  1-3  

 

 Total Bilirubin  0.90 mg/dL  N  0.2-1.0  

 

 Alkaline Phosphatase  70 U/L  N    

 

 Alt  13 U/L  N  7-52  

 

 Ast  15 U/L  N  13-39  

 

 Egfr Non-  78.8    >60  

 

 Egfr   101.3    >60  10









 Laboratory test finding  2018  [facility]  Blood Culture  SEE RESULT BELOW      
11

 

 Comp Metabolic Panel  2017  [facility]  Sodium  137 mmol/L  N  133-145  









 Potassium  3.5 mmol/L  N  3.5-5.0  

 

 Chloride  101 mmol/L  N  101-111  

 

 Co2 Carbon Dioxide  31 mmol/L  N  22-32  

 

 Anion Gap  5 mmol/L  N  2-11  

 

 Glucose  127 mg/dL  High    

 

 Blood Urea Nitrogen  20 mg/dL  N  6-24  

 

 Creatinine  0.96 mg/dL  N  0.67-1.17  

 

 BUN/Creatinine Ratio  20.8  High  8-20  

 

 Calcium  9.2 mg/dL  N  8.6-10.3  

 

 Total Protein  6.0 g/dL  Low  6.4-8.9  

 

 Albumin  3.8 g/dL  N  3.2-5.2  

 

 Globulin  2.2 g/dL  N  2-4  

 

 Albumin/Globulin Ratio  1.7  N  1-3  

 

 Total Bilirubin  0.80 mg/dL  N  0.2-1.0  

 

 Alkaline Phosphatase  71 U/L  N    

 

 Alt  15 U/L  N  7-52  

 

 Ast  14 U/L  N  13-39  

 

 Egfr Non-  75.2    >60  

 

 Egfr   96.7    >60  12









 Laboratory test finding  2017  [facility]  C Reactive Protein  1.38 mg/L  N  < 
5.00  13

 

 CBC Auto Diff  2017  [facility]  White Blood Count  8.2 10^3/uL  N  3.5-10.8  









 Red Blood Count  5.00 10^6/uL  N  4.0-5.4  

 

 Hemoglobin  14.6 g/dL  N  14.0-18.0  

 

 Hematocrit  43 %  N  42-52  

 

 Mean Corpuscular Volume  87 fL  N  80-94  

 

 Mean Corpuscular Hemoglobin  29 pg  N  27-31  

 

 Mean Corpuscular HGB Conc  34 g/dL  N  31-36  

 

 Red Cell Distribution Width  13 %  N  10.5-15  

 

 Platelet Count  183 10^3/uL  N  150-450  

 

 Mean Platelet Volume  8 um3  N  7.4-10.4  

 

 Abs Neutrophils  4.8 10^3/uL  N  1.5-7.7  

 

 Abs Lymphocytes  2.5 10^3/uL  N  1.0-4.8  

 

 Abs Monocytes  0.6 10^3/uL  N  0-0.8  

 

 Abs Eosinophils  0.2 10^3/uL  N  0-0.6  

 

 Abs Basophils  0.1 10^3/uL  N  0-0.2  

 

 Abs Nucleated RBC  0.01 10^3/uL      

 

 Granulocyte %  58.8 %  N  38-83  

 

 Lymphocyte %  30.1 %  N  25-47  

 

 Monocyte %  7.4 %  N  1-9  

 

 Eosinophil %  2.9 %  N  0-6  

 

 Basophil %  0.8 %  N  0-2  

 

 Nucleated Red Blood Cells %  0.1      









 Urinalysis Profile  2017  [facility]  Urine Color  Yellow      









 Urine Appearance  Clear      

 

 Urine Specific Gravity  1.021  N  1.010-1.030  

 

 Urine pH  6.0  N  5-9  

 

 Urine Urobilinogen  Negative    Negative  

 

 Urine Ketones  Negative    Negative  

 

 Urine Protein  Negative    Negative  

 

 Urine Leukocytes  Negative    Negative  

 

 Urine Blood  Negative    Negative  

 

 *  *  Abnormal  Negative  14

 

 Urine Nitrite  Negative    Negative  

 

 Urine Bilirubin  Negative    Negative  

 

 Urine Glucose  1+(50 mg/dL)  Abnormal  Negative  









 Urinalysis Profile  2017  [facility]  Urine Color  Yellow      









 Urine Appearance  Clear      

 

 Urine Specific Gravity  1.023  N  1.010-1.030  

 

 Urine pH  5.0  N  5-9  

 

 Urine Urobilinogen  Negative    Negative  

 

 Urine Ketones  Negative    Negative  

 

 Urine Protein  Negative    Negative  

 

 Urine Leukocytes  Negative    Negative  

 

 Urine Blood  Negative    Negative  

 

 *  *  Abnormal  Negative  15

 

 Urine Nitrite  Negative    Negative  

 

 Urine Bilirubin  Negative    Negative  

 

 Urine Glucose  1+(50 mg/dL)  Abnormal  Negative  

 

 Urine White Blood Cell  Absent    Absent  

 

 Urine Red Blood Cell  1+(3-5/hpf)  Abnormal  Absent  

 

 Urine Bacteria  Absent    Absent  

 

 Urine Squamous Epithelial Cell  Present  Abnormal  Absent  









 Laboratory test  2017  [facility]  Urine Culture And  SEE RESULT BELOW      16



 finding      Sensitivities        

 

 Urinalysis Profile  10/12/2017  [facility] [place]  Urine Color  Yellow  N    









 Urine Appearance  Clear  N    

 

 Urine Specific Gravity  1.011  N  1.010-1.030  

 

 Urine pH  7.0  N  5-9  

 

 Urine Urobilinogen  Negative  N  Negative  

 

 Urine Ketones  Negative  N  Negative  

 

 Urine Protein  Negative  N  Negative  

 

 Urine Leukocytes  Negative  N  Negative  

 

 Urine Blood  Negative  N  Negative  

 

 *  *  Abnormal  Negative  17

 

 Urine Nitrite  Negative  N  Negative  

 

 Urine Bilirubin  Negative  N  Negative  

 

 Urine Glucose  3+(>=500 mg/dL)  Abnormal  Negative  









 Lipid Profile  2017  [facility] ([facility]  Cholesterol  168 mg/dL    120-
200  









 Triglycerides  329 mg/dL  High    

 

 HDL Cholesterol  38 mg/dL    30-70  

 

 LDL (Calculated)  64 CALC    0-129  

 

 VLDL Cholesterol  66 mg/dL  High  0-50  

 

 HDL Risk Factor  4.4 CALC    0.0-4.4  









 Comprehensive Metabolic  2017  [facility] ([facility]  Sodium  142 mEq/L  
  134-149  



 Prof              









 Potassium  3.9 mEq/L    3.6-5.5  

 

 Chloride  99 mEq/L      

 

 Carbon Dioxide  27 mEq/L    21-32  

 

 Glucose  104 mg/dL      

 

 BUN  20 mg/dL    6-26  

 

 Creatinine  0.9 mg/dL    0.6-1.4  

 

 BUN/Creat Ratio  22.2 CALC    8.0-36.0  

 

 Calcium  9.3 mg/dL    8.6-10.2  

 

 Total Protein  6.8 g/dL    6.4-8.3  

 

 Albumin  4.4 g/dL    3.8-5.5  

 

 Globulin  2.4 g/dL    2.0-4.8  

 

 A/G Ratio  1.8 CALC    0.6-2.3  

 

 Alk. Phosphatase  87 U/L    22-95  

 

 Alt (SGPT)  21 U/L    7-35  

 

 Ast (Sgot)  16 U/L    5-34  

 

 Total Bilirubin  0.8 mg/dL    0.2-1.3  

 

 GFR Non-African American  >60 ml/min/1.73m^    >=60  

 

 GFR African American  >60 ml/min/1.73m^    >=60  









 Laboratory test finding  2017  [facility] ([facility])  LDL, Direct  74 mg/
dL    0-130  

 

 Urinalysis Profile  2017  [facility]  Urine Color  Yellow  N    









 Urine Appearance  Turbid  N    

 

 Urine Specific Gravity  1.021  N  1.010-1.030  

 

 Urine pH  5.0  N  5-9  

 

 Urine Urobilinogen  Negative  N  Negative  

 

 Urine Ketones  Negative  N  Negative  

 

 Urine Protein  Negative  N  Negative  

 

 Urine Leukocytes  Negative  N  Negative  

 

 Urine Blood  Negative  N  Negative  

 

 *  *  Abnormal  Negative  18

 

 Urine Nitrite  Negative  N  Negative  

 

 Urine Bilirubin  Negative  N  Negative  

 

 Urine Glucose  2+(150 mg/dL)  Abnormal  Negative  









 Laboratory test  2017  [facility]  Urine Culture And  SEE RESULT      19



 finding      Sensitivities  BELOW      

 

 CBC Auto Diff  2017  [facility]  White Blood Count  11.7 10^3/uL  High  3.5-
10.8  









 Red Blood Count  5.00 10^6/uL  N  4.0-5.4  

 

 Hemoglobin  14.5 g/dL  N  14.0-18.0  

 

 Hematocrit  43 %  N  42-52  

 

 Mean Corpuscular Volume  86 fL  N  80-94  

 

 Mean Corpuscular Hemoglobin  29 pg  N  27-31  

 

 Mean Corpuscular HGB Conc  34 g/dL  N  31-36  

 

 Red Cell Distribution Width  13 %  N  10.5-15  

 

 Platelet Count  184 10^3/uL  N  150-450  

 

 Mean Platelet Volume  8 um3  N  7.4-10.4  

 

 Abs Neutrophils  8.7 10^3/uL  High  1.5-7.7  

 

 Abs Lymphocytes  1.6 10^3/uL  N  1.0-4.8  

 

 Abs Monocytes  1.2 10^3/uL  High  0-0.8  

 

 Abs Eosinophils  0.2 10^3/uL  N  0-0.6  

 

 Abs Basophils  0.1 10^3/uL  N  0-0.2  

 

 Abs Nucleated RBC  0.01 10^3/uL  N    

 

 Granulocyte %  74.1 %  N  38-83  

 

 Lymphocyte %  13.3 %  Low  25-47  

 

 Monocyte %  10.6 %  High  1-9  

 

 Eosinophil %  1.4 %  N  0-6  

 

 Basophil %  0.6 %  N  0-2  

 

 Nucleated Red Blood Cells %  0.1  N    









 Laboratory test finding  2017  [facility]  Lactic Acid  1.5 mmol/L  N  0.5-2.0  
20

 

 Inr/Protime  2017  [facility]  Inr  1.05  N  0.89-1.11  

 

 Comp Metabolic Panel  2017  [facility]  Sodium  131 mmol/L  Low  133-145  









 Potassium  3.4 mmol/L  Low  3.5-5.0  

 

 Chloride  100 mmol/L  Low  101-111  

 

 Co2 Carbon Dioxide  25 mmol/L  N  22-32  

 

 Anion Gap  6 mmol/L  N  2-11  

 

 Glucose  168 mg/dL  High    

 

 Blood Urea Nitrogen  16 mg/dL  N  6-24  

 

 Creatinine  0.89 mg/dL  N  0.67-1.17  

 

 BUN/Creatinine Ratio  18.0  N  8-20  

 

 Calcium  8.8 mg/dL  N  8.6-10.3  

 

 Total Protein  6.1 g/dL  Low  6.4-8.9  

 

 Albumin  3.5 g/dL  N  3.2-5.2  

 

 Globulin  2.6 g/dL  N  2-4  

 

 Albumin/Globulin Ratio  1.3  N  1-3  

 

 Total Bilirubin  0.90 mg/dL  N  0.2-1.0  

 

 Alkaline Phosphatase  86 U/L  N    

 

 Alt  14 U/L  N  7-52  

 

 Ast  14 U/L  N  13-39  

 

 Egfr Non-  82.0  N  >60  

 

 Egfr   105.5  N  >60  21









 Laboratory test finding  2017  [facility]  Magnesium  1.9 mg/dL  N  1.9-2.7  









 Troponin I  0.03 ng/mL  N  <0.04  

 

 TSH (Thyroid Stim Horm)  1.00 mcIU/mL  N  0.34-5.60  









 Urinalysis Profile  2017  [facility]  Urine Color  Yellow  N    









 Urine Appearance  Clear  N    

 

 Urine Specific Gravity  1.018  N  1.010-1.030  

 

 Urine pH  5.0  N  5-9  

 

 Urine Urobilinogen  Negative  N  Negative  

 

 Urine Ketones  Negative  N  Negative  

 

 Urine Protein  Negative  N  Negative  

 

 Urine Leukocytes  Negative  N  Negative  

 

 Urine Blood  Negative  N  Negative  

 

 *  *  Abnormal  Negative  22

 

 Urine Nitrite  Negative  N  Negative  

 

 Urine Bilirubin  Negative  N  Negative  

 

 Urine Glucose  1+(50 mg/dL)  Abnormal  Negative  

 

 Urine White Blood Cell  Trace(0-5/hpf)  N  Absent  

 

 Urine Red Blood Cell  Absent  N  Absent  

 

 Urine Bacteria  Absent  N  Absent  

 

 Urine Squamous Epithelial Cell  Present  Abnormal  Absent  









 Laboratory test  2017  [facility]  Urine Culture And  SEE RESULT BELOW  l,.c    
23



 finding      Sensitivities        

 

 Urinalysis Profile  2017  [facility]  Urine Color  Yellow  N    









 Urine Appearance  Cloudy  N    

 

 Urine Specific Gravity  1.027  N  1.010-1.030  

 

 Urine pH  5.0  N  5-9  

 

 Urine Urobilinogen  Negative  N  Negative  

 

 Urine Ketones  Trace  Abnormal  Negative  

 

 Urine Protein  Negative  N  Negative  

 

 Urine Leukocytes  Negative  N  Negative  

 

 Urine Blood  Negative  N  Negative  

 

 *  *  Abnormal  Negative  24

 

 Urine Nitrite  Negative  N  Negative  

 

 Urine Bilirubin  Negative  N  Negative  

 

 Urine Glucose  Negative  N  Negative  









 Comp Metabolic Panel  2017  [facility]  Sodium  138 mmol/L  N  133-145  









 Potassium  3.5 mmol/L  N  3.5-5.0  

 

 Chloride  102 mmol/L  N  101-111  

 

 Co2 Carbon Dioxide  24 mmol/L  N  22-32  

 

 Anion Gap  12 mmol/L  High  2-11  

 

 Glucose  175 mg/dL  High    

 

 Blood Urea Nitrogen  26 mg/dL  High  6-24  

 

 Creatinine  0.83 mg/dL  N  0.67-1.17  

 

 BUN/Creatinine Ratio  31.3  High  8-20  

 

 Calcium  9.2 mg/dL  N  8.6-10.3  

 

 Total Protein  6.4 g/dL  N  6.4-8.9  

 

 Albumin  3.9 g/dL  N  3.2-5.2  

 

 Globulin  2.5 g/dL  N  2-4  

 

 Albumin/Globulin Ratio  1.6  N  1-3  

 

 Total Bilirubin  0.90 mg/dL  N  0.2-1.0  

 

 Alkaline Phosphatase  78 U/L  N    

 

 Alt  12 U/L  N  7-52  

 

 Ast  12 U/L  Low  13-39  

 

 Egfr Non-  88.9  N  >60  

 

 Egfr   114.4  N  >60  25









 Laboratory test  2017  [facility]  Troponin I  0.01 ng/mL  N  <0.04  26



 finding              

 

 CBC Auto Diff  2017  [facility]  White Blood Count  11.8 10^3/uL  High  3.5-
10.8  









 Red Blood Count  5.62 10^6/uL  High  4.0-5.4  

 

 Hemoglobin  16.1 g/dL  N  14.0-18.0  

 

 Hematocrit  49 %  N  42-52  

 

 Mean Corpuscular Volume  86 fL  N  80-94  

 

 Mean Corpuscular Hemoglobin  29 pg  N  27-31  

 

 Mean Corpuscular HGB Conc  33 g/dL  N  31-36  

 

 Red Cell Distribution Width  13 %  N  10.5-15  

 

 Platelet Count  187 10^3/uL  N  150-450  

 

 Mean Platelet Volume  8 um3  N  7.4-10.4  

 

 Abs Neutrophils  10.4 10^3/uL  High  1.5-7.7  

 

 Abs Lymphocytes  0.9 10^3/uL  Low  1.0-4.8  

 

 Abs Monocytes  0.4 10^3/uL  N  0-0.8  

 

 Abs Eosinophils  0 10^3/uL  N  0-0.6  

 

 Abs Basophils  0 10^3/uL  N  0-0.2  

 

 Abs Nucleated RBC  0 10^3/uL  N    

 

 Granulocyte %  88.5 %  High  38-83  

 

 Lymphocyte %  7.4 %  Low  25-47  

 

 Monocyte %  3.6 %  N  1-9  

 

 Eosinophil %  0.2 %  N  0-6  

 

 Basophil %  0.3 %  N  0-2  

 

 Nucleated Red Blood Cells %  0  N    









 Laboratory test finding  2017  [facility]  Lactic Acid  2.7 mmol/L  High  0.5-
2.0  27









 Rapid Influenza A & B Antigen  SEE RESULT BELOW      28

 

 Blood Culture  SEE RESULT BELOW      29









 Laboratory test finding  2016  [facility]  Ammonia  59 ?mol/L  High  16-53  

 

 CBC Auto Diff  2016  [facility]  White Blood Count  8.6 10^3/uL  N  3.5-10.8  









 Red Blood Count  5.26 10^6/uL  N  4.0-5.4  

 

 Hemoglobin  15.3 g/dL  N  14.0-18.0  

 

 Hematocrit  45 %  N  42-52  

 

 Mean Corpuscular Volume  85 fL  N  80-94  

 

 Mean Corpuscular Hemoglobin  29 pg  N  27-31  

 

 Mean Corpuscular HGB Conc  35 g/dL  N  31-36  

 

 Red Cell Distribution Width  13 %  N  10.5-15  

 

 Platelet Count  186 10^3/uL  N  150-450  

 

 Mean Platelet Volume  7 um3  Low  7.4-10.4  

 

 Abs Neutrophils  5.3 10^3/uL  N  1.5-7.7  

 

 Abs Lymphocytes  2.3 10^3/uL  N  1.0-4.8  

 

 Abs Monocytes  0.6 10^3/uL  N  0-0.8  

 

 Abs Eosinophils  0.3 10^3/uL  N  0-0.6  

 

 Abs Basophils  0.1 10^3/uL  N  0-0.2  

 

 Abs Nucleated RBC  0.01 10^3/uL  N    

 

 Granulocyte %  61.4 %  N  38-83  

 

 Lymphocyte %  26.7 %  N  25-47  

 

 Monocyte %  7.5 %  N  1-9  

 

 Eosinophil %  3.8 %  N  0-6  

 

 Basophil %  0.6 %  N  0-2  

 

 Nucleated Red Blood Cells %  0.1  N    









 Laboratory test finding  2016  [facility]  B-Type Natriuretic Peptide BNP  89 pg
/mL  N    30









 Lactic Acid  1.7 mmol/L  N  0.5-2.0  31









 Comp Metabolic Panel  2016  [facility]  Sodium  136 mmol/L  N  133-145  









 Potassium  3.5 mmol/L  N  3.5-5.0  

 

 Chloride  101 mmol/L  N  101-111  

 

 Co2 Carbon Dioxide  30 mmol/L  N  22-32  

 

 Anion Gap  5 mmol/L  N  2-11  

 

 Glucose  121 mg/dL  High    

 

 Blood Urea Nitrogen  13 mg/dL  N  6-24  

 

 Creatinine  0.96 mg/dL  N  0.67-1.17  

 

 BUN/Creatinine Ratio  13.5  N  8-20  

 

 Calcium  9.0 mg/dL  N  8.6-10.3  

 

 Total Protein  6.1 g/dL  Low  6.4-8.9  

 

 Albumin  3.6 g/dL  N  3.2-5.2  

 

 Globulin  2.5 g/dL  N  2-4  

 

 Albumin/Globulin Ratio  1.4  N  1-3  

 

 Total Bilirubin  0.60 mg/dL  N  0.2-1.0  

 

 Alkaline Phosphatase  80 U/L  N    

 

 Alt  12 U/L  N  7-52  

 

 Ast  11 U/L  Low  13-39  

 

 Egfr Non-  75.4  N  >60  

 

 Egfr   96.9  N  >60  32









 Laboratory test finding  2016  [facility]  Creatine Kinase(CK)  55 U/L  N  10-
223  









 Troponin I  0.00 ng/mL  N  <0.04  33









 Inr/Protime  2016  [facility]  Inr  0.94  N  0.89-1.11  

 

 Laboratory test finding  2016  [facility]  Partial Thrombo  30.8 seconds  N  
26.0-36.3  



       Time PTT        









 Myoglobin  27.2 ng/mL  N  17.4-105.7  









 CKMB  2016  [facility]  CKMB  ng/mL  1.8 ng/mL  N  0.6-6.3  

 

 Laboratory test finding  2016  [facility]  Vitamin B12  325 pg/mL  N  180-914  
34









 TSH (Thyroid Stim Horm)  1.24 mcIU/mL  N  0.34-5.60  

 

 Folic Acid (Folate)  8.59 ng/mL  N  >3.99  









 Laboratory test finding  2016  [facility]  Point of Care Glucose  110 mg/dL  
High    35

 

 Urinalysis Profile  2016  [facility]  Urine Color  Yellow  N    









 Urine Appearance  Clear  N    

 

 Urine Specific Gravity  1.014  N  1.010-1.030  

 

 Urine pH  6.0  N  5-9  

 

 Urine Urobilinogen  Negative  N  Negative  

 

 Urine Ketones  Negative  N  Negative  

 

 Urine Protein  Negative  N  Negative  

 

 Urine Leukocytes  Negative  N  Negative  

 

 Urine Blood  Negative  N  Negative  

 

 *  *  Abnormal  Negative  36

 

 Urine Nitrite  Negative  N  Negative  

 

 Urine Bilirubin  Negative  N  Negative  

 

 Urine Glucose  3+(>=500 mg/dL)  Abnormal  Negative  









 CBC No Diff  2016  [facility]  White Blood Count  8.5 10^3/uL  N  3.5-10.8  









 Red Blood Count  5.41 10^6/uL  High  4.0-5.4  

 

 Hemoglobin  15.8 g/dL  N  14.0-18.0  

 

 Hematocrit  47 %  N  42-52  

 

 Mean Corpuscular Volume  86 fL  N  80-94  

 

 Mean Corpuscular Hemoglobin  29 pg  N  27-31  

 

 Mean Corpuscular HGB Conc  34 g/dL  N  31-36  

 

 Red Cell Distribution Width  13 %  N  10.5-15  

 

 Platelet Count  223 10^3/uL  N  150-450  

 

 Mean Platelet Volume  8 um3  N  7.4-10.4  









 Urinalysis Profile  2016  [facility]  Urine Color  Yellow  N    









 Urine Appearance  Clear  N    

 

 Urine Specific Gravity  1.008  Low  1.010-1.030  

 

 Urine pH  6.0  N  5-9  

 

 Urine Urobilinogen  Negative  N  Negative  

 

 Urine Ketones  Negative  N  Negative  

 

 Urine Protein  Negative  N  Negative  

 

 Urine Leukocytes  Negative  N  Negative  

 

 Urine Blood  Negative  N  Negative  

 

 Urine Nitrite  Negative  N  Negative  

 

 Urine Bilirubin  Negative  N  Negative  

 

 Urine Glucose  Negative  N  Negative  









 Comp Metabolic Panel  2016  [facility]  Sodium  136 mmol/L  N  133-145  









 Potassium  3.6 mmol/L  N  3.5-5.0  

 

 Chloride  100 mmol/L  Low  101-111  

 

 Co2 Carbon Dioxide  31 mmol/L  N  22-32  

 

 Anion Gap  5 mmol/L  N  2-11  

 

 Glucose  96 mg/dL  N    

 

 Blood Urea Nitrogen  11 mg/dL  N  6-24  

 

 Creatinine  0.82 mg/dL  N  0.67-1.17  

 

 BUN/Creatinine Ratio  13.4  N  8-20  

 

 Calcium  9.0 mg/dL  N  8.6-10.3  

 

 Total Protein  6.3 g/dL  Low  6.4-8.9  

 

 Albumin  4.2 g/dL  N  3.2-5.2  

 

 Globulin  2.1 g/dL  N  2-4  

 

 Albumin/Globulin Ratio  2.0  N  1-3  

 

 Total Bilirubin  0.60 mg/dL  N  0.2-1.0  

 

 Alkaline Phosphatase  92 U/L  N    

 

 Alt  14 U/L  N  7-52  

 

 Ast  12 U/L  Low  13-39  

 

 Egfr Non-  90.4  N  >60  

 

 Egfr   116.3  N  >60  37









 Lipid Profile  2016  [facility] ([facility])  Cholesterol  175 mg/dL    120-
200  









 Triglycerides  348 mg/dL  High    

 

 HDL Cholesterol  38 mg/dL    30-70  

 

 LDL (Calculated)  67 CALC    0-129  38

 

 VLDL Cholesterol  70 mg/dL  High  0-50  

 

 HDL Risk Factor  4.6 CALC  High  0.0-4.4  









 Comprehensive Metabolic  2016  [facility] ([facility])  Sodium  137 mEq/L  
  134-149  



 Prof              









 Potassium  3.8 mEq/L    3.6-5.5  

 

 Chloride  99 mEq/L      

 

 Carbon Dioxide  30 mEq/L    21-32  

 

 Glucose  131 mg/dL  High    39

 

 BUN  12 mg/dL    6-26  

 

 Creatinine  0.9 mg/dL    0.6-1.4  

 

 BUN/Creat Ratio  13.3 CALC    8.0-36.0  

 

 Calcium  8.9 mg/dL    8.6-10.2  

 

 Total Protein  6.6 g/dL    6.4-8.3  

 

 Albumin  4.2 g/dL    3.8-5.5  

 

 Globulin  2.4 g/dL    2.0-4.8  

 

 A/G Ratio  1.8 CALC    0.6-2.3  

 

 Alk. Phosphatase  90 U/L    22-95  

 

 Alt (SGPT)  15 U/L    7-35  

 

 Ast (Sgot)  12 U/L    5-34  

 

 Total Bilirubin  0.6 mg/dL    0.2-1.3  

 

 GFR Non-African American  >60 ml/min/1.73m^    >=60  

 

 GFR African American  >60 ml/min/1.73m^    >=60  









 Laboratory test finding  2016  [facility] ([facility])  LDL, Direct  83 mg/
dL    0-130  

 

 Urinalysis Profile  10/09/2015  [facility]  Urine Color  Straw  N    









 Urine Appearance  Clear  N    

 

 Urine Specific Gravity  1.003  Low  1.010-1.030  

 

 Urine pH  7.0  N  5-9  

 

 Urine Urobilinogen  Negative  N  Negative  

 

 Urine Ketones  Negative  N  Negative  

 

 Urine Protein  Negative  N  Negative  

 

 Urine Leukocytes  Negative  N  Negative  

 

 Urine Blood  Negative  N  Negative  

 

 Urine Nitrite  Negative  N  Negative  

 

 Urine Bilirubin  Negative  N  Negative  

 

 Urine Glucose  Negative  N  Negative  









 Laboratory test  10/09/2015  [facility]  Urine Culture And  SEE RESULT      40



 finding      Sensitivities  BELOW      

 

 CBC Electronic  2015  [facility]  WBC  9.3    3.6-9.  



 ([facility])    (607)-   -        6  









 RBC  5.39    3.90-5.70  

 

 Hemoglobin ([facility])  16.5 g/dL    12.1 - 17.2  

 

 Hematocrit (a/[facility])  47.5 %    36.1 - 50.3  

 

 Platelets  230 10^3/ul    150-400  

 

 Lymph%  36.2 %    17.0-48.0  

 

 Mixed%  6.1      

 

 Neutrophils %  57.7      

 

 Mean Corpuscular Vol  88    82.2-97.4  

 

 Mean Corpuscular Hemoglobin  30.6    27.6-33.3  

 

 Mean Corpuscular Hemo Concen  34.8    32.0-36.0  

 

 RDW  13.0    11.6-13.7  

 

 Mean Platelet Volume  6.6    5.5-11.0  









 Ua - Micro (a)  2015  [facility]  Appearance  yellow      



     (607)-   -          









 Color  clear      

 

 Glucose, Urine (a/[facility])  neg      

 

 Bilirubin  neg      

 

 Ketones  neg      

 

 SP Grav  1.010      

 

 Blood  neg      

 

 PH  6.5      

 

 Protein  neg      

 

 Urobil  1.0      

 

 Nitrite  neg      

 

 Leukocytes (a/[facility])  neg      

 

 Hyaline  - /Lpf      

 

 Granular  - /Lpf      

 

 WBC (a,[facility])  -      

 

 RBC  -      

 

 Mucus ([facility])  - /Lpf      

 

 Epith  - /Lpf      

 

 Bacteria  - /Hpf      

 

 Amorphous (a/[facility])  - /Lpf      

 

 Crystals, Fluid ([facility])  -      

 

 Z#Comments  -      









 Basic Metabolic Profile  2015  [facility] ([facility])  Sodium  137 mEq/L  
  134-149  









 Potassium  3.9 mEq/L    3.6-5.5  

 

 Chloride  98 mEq/L      

 

 Carbon Dioxide  28 mEq/L    21-32  

 

 Glucose  100 mg/dL      

 

 BUN  13 mg/dL    6-26  

 

 Creatinine  0.8 mg/dL    0.6-1.4  

 

 BUN/Creat Ratio  16.3 CALC    8.0-36.0  

 

 Calcium  9.9 mg/dL    8.6-10.2  

 

 GFR Non-African American  >60 ml/min/1.73m^    >=60  

 

 GFR African American  >60 ml/min/1.73m^    >=60  









 Laboratory test  2015  [facility] ([facility])  Free T4  1.09 ng/dL    0.75-
1.54  



 finding              









 TSH  1.66 mIU/L    0.50-6.00  









 Surgical Pathology  2015  [facility]  S  RUN DATE: 



         <SEE NOTE>      

 

 Surgical Pathology  2015  [facility]  S  RUN DATE: 



         <SEE NOTE>      

 

 Surgical Pathology  2014  [facility]  S  RUN DATE: 



         <SEE NOTE>      

 

 Laboratory test finding  2014  [facility]  PSA Screening  0.488 ng/mL  N  0-
4.000  44

 

 Comp Metabolic-ALL Lab  2014  [facility]  Sodium  138 mmol/L  N  133-145  



 Compani              









 Potassium  4.3 mmol/L  N  3.5-5.0  45

 

 Chloride  105 mmol/L  N  101-111  

 

 Co2 Carbon Dioxide  27 mmol/L  N  22-32  

 

 Anion Gap  6 mmol/L  N  2-11  

 

 Glucose  98 mg/dL  N    

 

 Blood Urea Nitrogen  14 mg/dL  N  6-24  

 

 Creatinine  0.78 mg/dL  N  0.67-1.17  

 

 BUN/Creatinine Ratio  17.9  N  8-20  

 

 Calcium  9.1 mg/dL  N  8.6-10.3  

 

 Total Protein  5.8 g/dL  Low  6.4-8.9  

 

 Albumin  3.8 g/dL  N  3.2-5.2  

 

 Globulin  2.0 g/dL  N  2-4  

 

 Albumin/Globulin Ratio  1.9  N  1-3  

 

 Total Bilirubin  0.90 mg/dL  N  0.2-1.0  

 

 Alkaline Phosphatase  87 U/L  N    

 

 Alt  16 U/L  N  7-52  

 

 Ast  11 U/L  Low  13-39  

 

 Egfr Non-  96.3  N  >60  

 

 Egfr   123.8  N  >60  46









 Lipid Panel-[facility]  2014  [facility]  Triglycerides  256 mg/dL  N    
47









 Cholesterol  165 mg/dL  N    48

 

 HDL Cholesterol  32.6 mg/dL  N    49

 

 LDL Cholesterol  81 mg/dL  N    50









 Laboratory test  2014  [facility]  Misc Lab Test  comp,lipid,tsh
      



 finding              









 1  [id number]

 

 2  *Ascorbic acid is present which may interfere with detection



   of blood.

 

 3  SEE RESULT BELOW



   -----------------------------------------------------------------------------
---------------



   Name:  JESÚS DAI                    : 1935    Attend Dr: Darion Leone MD



   Acct:  [id number]  Unit: [id number]  AGE: 83            Location:  [facility]



   Re18                        SEX: M             Status:    REG REF



   -----------------------------------------------------------------------------
---------------



   



   SPEC: [id number]         [facility]:       [facility] DR: Darion Leone MD



   REQ:  [id number]              RECD:   



   STATUS: COMP



   _



   SOURCE: URINE          SPDESC:



   ORDERED:  Urine Culture



   COMMENTS: [id number]



   QUERIES:  Urine Source: Random



   



   -----------------------------------------------------------------------------
---------------



   Procedure                         Result                         Reported   
        Site



   -----------------------------------------------------------------------------
---------------



   Urine Culture  Final                                             18-
1636      ML



   



   No growth of clinically significant organisms



   



   -----------------------------------------------------------------------------
---------------



   * ML - Main Lab



   .



   



   



   



   



   



   



   



   



   



   



   



   



   



   



   



   



   



   



   



   



   



   



   



   



   ** END OF REPORT **



   



   DEPARTMENT OF PATHOLOGY,  [address] [place] [address]



   Phone # [phone]      Fax #[phone]



   Floyd Vieira M.D. Director     [facility] # [id number]

 

 4  [facility] Severe Sepsis and Septic Shock Management Bundle Measure



   requires all lactic acids initially measuring >2.0 mmol/L be



   repeated.

 

 5  [facility] Severe Sepsis and Septic Shock Management Bundle Measure



   requires all lactic acids initially measuring >2.0 mmol/L be



   repeated.

 

 6  *******Because ethnic data is not always readily available,



   this report includes an eGFR for both -Americans and



   non- Americans.****



   The National Kidney Disease Education Program (NKDEP) does



   not endorse the use of the MDRD equation for patients that



   are not between the ages of 18 and 70, are pregnant, have



   extremes of body size, muscle mass, or nutritional status,



   or are non- or non-.



   According to the National Kidney Foundation, irrespective of



   diagnosis, the stage of the disease is based on the level of



   kidney function:



   Stage Description                      GFR(mL/min/1.73 m(2))



   1     Kidney damage with normal or decreased GFR       90



   2     Kidney damage with mild decrease in GFR          60-89



   3     Moderate decrease in GFR                         30-59



   4     Severe decrease in GFR                           15-29



   5     Kidney failure                       <15 (or dialysis)

 

 7  Interpretive information available on [facility]



   Catalog at [email or web address]

 

 8  SEE RESULT BELOW



   -----------------------------------------------------------------------------
---------------



   Name:  JESÚS DAI                    : 1935    Attend Dr: Carter Montenegro MD



   Acct:  [id number]  Unit: [id number]  AGE: 83            Location:  [place] [address]



   Re18      Dis:  18    SEX: M             Status:    DIS Flo



   -----------------------------------------------------------------------------
---------------



   



   SPEC: [id number]         HUMA:       [facility] DR: Ry Brand MD



   REQ:  [id number]              RECD:   



   STATUS: COMP             OTHR DR: [facility]



   Darion Rush MD



   _



   SOURCE: BLOOD,VENO     SPDESC:



   ORDERED:  Blood Cult



   



   -----------------------------------------------------------------------------
---------------



   Procedure                         Result                         Reported   
        Site



   -----------------------------------------------------------------------------
---------------



   Aerobic Culture Bottle  Final                                    18-
2256      ML



   No Growth Day 5



   



   Anaerobic Culture Bottle  Final                                  18-
2256      ML



   No Growth Day 5



   



   -----------------------------------------------------------------------------
---------------



   * ML - Main Lab



   .



   



   



   



   



   



   



   



   



   



   



   



   



   



   



   



   



   



   



   



   



   



   



   ** END OF REPORT **



   



   DEPARTMENT OF PATHOLOGY,  [address] [place] [address]



   Phone # [phone]      Fax #[phone]



   Floyd Vieira M.D. Director     [facility] # [id number]

 

 9  [facility] Severe Sepsis and Septic Shock Management Bundle Measure



   requires all lactic acids initially measuring >2.0 mmol/L be



   repeated.

 

 10  *******Because ethnic data is not always readily available,



   this report includes an eGFR for both -Americans and



   non- Americans.****



   The National Kidney Disease Education Program (NKDEP) does



   not endorse the use of the MDRD equation for patients that



   are not between the ages of 18 and 70, are pregnant, have



   extremes of body size, muscle mass, or nutritional status,



   or are non- or non-.



   According to the National Kidney Foundation, irrespective of



   diagnosis, the stage of the disease is based on the level of



   kidney function:



   Stage Description                      GFR(mL/min/1.73 m(2))



   1     Kidney damage with normal or decreased GFR       90



   2     Kidney damage with mild decrease in GFR          60-89



   3     Moderate decrease in GFR                         30-59



   4     Severe decrease in GFR                           15-29



   5     Kidney failure                       <15 (or dialysis)

 

 11  SEE RESULT BELOW



   -----------------------------------------------------------------------------
---------------



   Name:  JESÚS DAI                    : 1935    Attend Dr: Nikolay Cazares MD



   Acct:  [id number]  Unit: [id number]  AGE: 83            [facility]  [facility]



   Re18                        SEX: M             Status:    DEP ER



   -----------------------------------------------------------------------------
---------------



   



   SPEC: [id number]         HUMA:       [facility] DR: Nikolay Cazares MD



   REQ:  [id number]              RECD:   



   STATUS: ÁNGELA JESUS DR: Darion Leone MD



   _



   SOURCE: BLOOD,VENO     SPDESC:



   ORDERED:  Blood Cult



   



   -----------------------------------------------------------------------------
---------------



   Procedure                         Result                         Reported   
        Site



   -----------------------------------------------------------------------------
---------------



   Aerobic Culture Bottle  Final                                    18-
      ML



   No Growth Day 5



   



   Anaerobic Culture Bottle  Final                                  18-
      ML



   No Growth Day 5



   



   -----------------------------------------------------------------------------
---------------



   * ML - [facility]



   .



   



   



   



   



   



   



   



   



   



   



   



   



   



   



   



   



   



   



   



   



   



   



   



   



   ** END OF REPORT **



   



   DEPARTMENT OF PATHOLOGY,  [address] [place] [address]



   Phone # [phone]      Fax #[phone]



   Floyd Vieira M.D. Director     [facility] # [id number]

 

 12  *******Because ethnic data is not always readily available,



   this report includes an eGFR for both -Americans and



   non- Americans.****



   The National Kidney Disease Education Program (NKDEP) does



   not endorse the use of the MDRD equation for patients that



   are not between the ages of 18 and 70, are pregnant, have



   extremes of body size, muscle mass, or nutritional status,



   or are non- or non-.



   According to the National Kidney Foundation, irrespective of



   diagnosis, the stage of the disease is based on the level of



   kidney function:



   Stage Description                      GFR(mL/min/1.73 m(2))



   1     Kidney damage with normal or decreased GFR       90



   2     Kidney damage with mild decrease in GFR          60-89



   3     Moderate decrease in GFR                         30-59



   4     Severe decrease in GFR                           15-29



   5     Kidney failure                       <15 (or dialysis)

 

 13  Acute inflammation:  >10.00

 

 14  *Ascorbic acid is present which may interfere with detection



   of blood.

 

 15  *Ascorbic acid is present which may interfere with detection



   of blood.

 

 16  SEE RESULT BELOW



   -----------------------------------------------------------------------------
---------------



   Name:  JESÚS DAI                    : 1935    Attend Dr: Darion Leone MD



   Acct:  [id number]  Unit: [id number]  AGE: 82            Location:  [facility]



   Re17                        SEX: M             Status:    REG REF



   -----------------------------------------------------------------------------
---------------



   



   SPEC: [id number]         HUMA:   17-    [facility] DR: Darion Leone MD



   REQ:  [id number]              RECD:   



   STATUS: COMP



   _



   SOURCE: URINE          SPDESC:



   ORDERED:  Urine Culture



   QUERIES:  Urine Source: Random



   



   -----------------------------------------------------------------------------
---------------



   Procedure                         Result                         Reported   
        Site



   -----------------------------------------------------------------------------
---------------



   Urine Culture  Final                                             17-
1329      ML



   



   No growth of clinically significant organisms



   



   -----------------------------------------------------------------------------
---------------



   * ML - MAIN LAB ([facility])



   .



   



   



   



   



   



   



   



   



   



   



   



   



   



   



   



   



   



   



   



   



   



   



   



   



   



   ** END OF REPORT **



   



   * ML=Testing performed at Main Lab



   DEPARTMENT OF PATHOLOGY,  [address] [place] [address]



   Phone # [phone]      Fax #[phone]



   Floyd Vieira M.D. Director     [facility] # [id number]

 

 17  *Ascorbic acid is present which may interfere with detection



   of blood.

 

 18  *Ascorbic acid is present which may interfere with detection



   of blood.

 

 19  SEE RESULT BELOW



   -----------------------------------------------------------------------------
---------------



   Name:  JESÚS DAI                    : 1935    Attend Dr: Isak Yo MD



   Acct:  [id number]  Unit: [id number]  AGE: 82            Location:  [facility]



   Re17                        SEX: M             Status:    REG REF



   -----------------------------------------------------------------------------
---------------



   



   SPEC: [id number]         HUMA:       [facility] DR: Isak Yo MD



   REQ:  [id number]              RECD:       EXPLICIT FAX#: [phone]



   STATUS: COMP             [facility] DR: Aure Leone MD



   _



   SOURCE: URINE          SPDESC:



   ORDERED:  Urine Culture



   QUERIES:  Urine Source: Random



   



   -----------------------------------------------------------------------------
---------------



   Procedure                         Result                         Reported   
        Site



   -----------------------------------------------------------------------------
---------------



   Urine Culture  Final                                             17-
1257      ML



   



   No growth of clinically significant organisms



   



   -----------------------------------------------------------------------------
---------------



   * ML - MAIN LAB ([facility])



   .



   



   



   



   



   



   



   



   



   



   



   



   



   



   



   



   



   



   



   



   



   



   



   



   



   ** END OF REPORT **



   



   * ML=Testing performed at Main Lab



   DEPARTMENT OF PATHOLOGY,  [address] [place] [address]



   Phone # [phone]      Fax #[phone]



   Floyd Vieira M.D. Director     [facility] # [id number]

 

 20  [facility] Severe Sepsis and Septic Shock Management Bundle Measure



   requires all lactic acids initially measuring >2.0 mmol/L be



   repeated.

 

 21  *******Because ethnic data is not always readily available,



   this report includes an eGFR for both -Americans and



   non- Americans.****



   The National Kidney Disease Education Program (NKDEP) does



   not endorse the use of the MDRD equation for patients that



   are not between the ages of 18 and 70, are pregnant, have



   extremes of body size, muscle mass, or nutritional status,



   or are non- or non-.



   According to the National Kidney Foundation, irrespective of



   diagnosis, the stage of the disease is based on the level of



   kidney function:



   Stage Description                      GFR(mL/min/1.73 m(2))



   1     Kidney damage with normal or decreased GFR       90



   2     Kidney damage with mild decrease in GFR          60-89



   3     Moderate decrease in GFR                         30-59



   4     Severe decrease in GFR                           15-29



   5     Kidney failure                       <15 (or dialysis)

 

 22  *Ascorbic acid is present which may interfere with detection



   of blood.

 

 23  SEE RESULT BELOW



   -----------------------------------------------------------------------------
---------------



   Name:  JESÚS DIA                    : 1935    Attend Dr: Darion Leone MD



   Acct:  [id number]  Unit: [id number]  AGE: 82            Location:  [facility]



   Re17                        SEX: M             Status:    REG REF



   -----------------------------------------------------------------------------
---------------



   



   SPEC: [id number]         HUMA:       [facility] DR: Darion Leone MD



   REQ:  [id number]              RECD:   



   STATUS: ÁNGELA JESUS DR: Aure



   _



   SOURCE: URINE          SPDESC:



   ORDERED:  Urine Culture



   



   -----------------------------------------------------------------------------
---------------



   Procedure                         Result                         Reported   
        Site



   -----------------------------------------------------------------------------
---------------



   Urine Culture  Final                                             04/10/17-
0840      ML



   No Growth (<1,000 CFU/mL)



   



   -----------------------------------------------------------------------------
---------------



   * ML - MAIN LAB ([facility])



   .



   



   



   



   



   



   



   



   



   



   



   



   



   



   



   



   



   



   



   



   



   



   



   



   



   



   



   



   ** END OF REPORT **



   



   * ML=Testing performed at Main Lab



   DEPARTMENT OF PATHOLOGY,  [address] [place] [address]



   Phone # [phone]      Fax #[phone]



   Floyd Vieira M.D. Director     [facility] # [id number]

 

 24  *Ascorbic acid is present which may interfere with detection



   of blood.

 

 25  *******Because ethnic data is not always readily available,



   this report includes an eGFR for both -Americans and



   non- Americans.****



   The National Kidney Disease Education Program (NKDEP) does



   not endorse the use of the MDRD equation for patients that



   are not between the ages of 18 and 70, are pregnant, have



   extremes of body size, muscle mass, or nutritional status,



   or are non- or non-.



   According to the National Kidney Foundation, irrespective of



   diagnosis, the stage of the disease is based on the level of



   kidney function:



   Stage Description                      GFR(mL/min/1.73 m(2))



   1     Kidney damage with normal or decreased GFR       90



   2     Kidney damage with mild decrease in GFR          60-89



   3     Moderate decrease in GFR                         30-59



   4     Severe decrease in GFR                           15-29



   5     Kidney failure                       <15 (or dialysis)

 

 26  99th percentile=0.04 ng/mL



   



   Troponin results at [facility] and [facility] are not interchangeable.

 

 27  Critical Result LACT:2.7 Called to ELIZABETH at: 21:49:58 by:[email or web address] Read 
back



   by:ELIZABETH FARRELL Severe Sepsis and Septic Shock Management Bundle Measure



   requires all lactic acids initially measuring >2.0 mmol/L be



   repeated.

 

 28  SEE RESULT BELOW



   -----------------------------------------------------------------------------
---------------



   Name:  JESÚS DAI                    : 1935    Attend Dr: 
Ry Brand MD



   Acct:  [id number]  Unit: [id number]  AGE: 82            Location:  [facility]



   Re17                        SEX: M             Status:    REG ER



   -----------------------------------------------------------------------------
---------------



   



   SPEC: [id number]         HUMA:       [facility] DR: Ry Brand MD



   REQ:  [id number]              RECD:   



   STATUS: ÁNGELA JESUS DR: Darion Leone MD



   _



   SOURCE: NASAL          SPDESC:



   ORDERED:  Flu A B Request



   



   -----------------------------------------------------------------------------
---------------



   Procedure                         Result                         Reported   
        Site



   -----------------------------------------------------------------------------
---------------



   Rapid Influenza A   B Request  Final                             17-
      ML



   Specimen received for Influenza A/B Molecular testing



   



   -----------------------------------------------------------------------------
---------------



   * ML - MAIN LAB ([facility])



   .



   



   



   



   



   



   



   



   



   



   



   



   



   



   



   



   



   



   



   



   



   



   



   



   



   



   



   



   ** END OF REPORT **



   



   * ML=Testing performed at Main Lab



   DEPARTMENT OF PATHOLOGY,  [address] [place] [address]



   Phone # [phone]      Fax #[phone]



   Floyd Vieira M.D. Director     [facility] # [id number]

 

 29  SEE RESULT BELOW



   -----------------------------------------------------------------------------
---------------



   Name:  JESÚS DAI                    : 1935    Attend Dr: Calvin Marshall MD



   Acct:  [id number]  Unit: [id number]  AGE: 82            Location:  [place] [address]



   Re/10/17      Dis:  17    SEX: M             Status:    DIS IN



   -----------------------------------------------------------------------------
---------------



   



   SPEC: [id number]         HUMA:   17-    [facility] DR: Ry Brand MD



   REQ:  [id number]              RECD:   



   STATUS: COMP             [facility] DR: Darion Leone MD



   _



   SOURCE: BLOOD,VENO     [facility]:



   ORDERED:  Blood Cult



   



   -----------------------------------------------------------------------------
---------------



   Procedure                         Result                         Reported   
        Site



   -----------------------------------------------------------------------------
---------------



   Aerobic Culture Bottle  Final                                    2119      ML



   No Growth Day 5



   



   Anaerobic Culture Bottle  Final                                  17-
      ML



   No Growth Day 5



   



   -----------------------------------------------------------------------------
---------------



   * ML - MAIN LAB ([facility])



   .



   



   



   



   



   



   



   



   



   



   



   



   



   



   



   



   



   



   



   



   



   



   



   



   



   ** END OF REPORT **



   



   * ML=Testing performed at Main Lab



   DEPARTMENT OF PATHOLOGY,  [address] [place] [address]



   Phone # [phone]      Fax #[phone]



   Floyd Vieira M.D. Director     [facility] # [id number]

 

 30  >100 to <200 pg/mL: likely compensated congestive heart



   failure (CHF)



   200 to 400 pg/mL: likely moderate CHF



   >400 pg/mL: likely moderate to severe CHF

 

 31  [place] Severe Sepsis and Septic Shock Management Bundle Measure



   requires all lactic acids initially measuring >2.0 mmol/L be



   repeated.

 

 32  *******Because ethnic data is not always readily available,



   this report includes an eGFR for both -Americans and



   non- Americans.****



   The National Kidney Disease Education Program (NKDEP) does



   not endorse the use of the MDRD equation for patients that



   are not between the ages of 18 and 70, are pregnant, have



   extremes of body size, muscle mass, or nutritional status,



   or are non- or non-.



   According to the National Kidney Foundation, irrespective of



   diagnosis, the stage of the disease is based on the level of



   kidney function:



   Stage Description                      GFR(mL/min/1.73 m(2))



   1     Kidney damage with normal or decreased GFR       90



   2     Kidney damage with mild decrease in GFR          60-89



   3     Moderate decrease in GFR                         30-59



   4     Severe decrease in GFR                           15-29



   5     Kidney failure                       <15 (or dialysis)

 

 33  NOTE: Critical Troponin is now >0.03 ng/mL.



   99th percentile=0.04 ng/mL



   



   Troponin results at [facility] and [facility] are not interchangeable.

 

 34  Normal Range 180 to 914



   Indeterminate Range 145 to 180



   Deficient Range  <145

 

 35  : [id number] LU[employer] E [employer]ION

 

 36  *Ascorbic acid is present which may interfere with detection



   of blood.

 

 37  *******Because ethnic data is not always readily available,



   this report includes an eGFR for both -Americans and



   non- Americans.****



   The National Kidney Disease Education Program (NKDEP) does



   not endorse the use of the MDRD equation for patients that



   are not between the ages of 18 and 70, are pregnant, have



   extremes of body size, muscle mass, or nutritional status,



   or are non- or non-.



   According to the National Kidney Foundation, irrespective of



   diagnosis, the stage of the disease is based on the level of



   kidney function:



   Stage Description                      GFR(mL/min/1.73 m(2))



   1     Kidney damage with normal or decreased GFR       90



   2     Kidney damage with mild decrease in GFR          60-89



   3     Moderate decrease in GFR                         30-59



   4     Severe decrease in GFR                           15-29



   5     Kidney failure                       <15 (or dialysis)

 

 38  INVALID

 

 39  RESULTS VERIFIED BY REPEAT ANALYSIS

 

 40  SEE RESULT BELOW



   -----------------------------------------------------------------------------
---------------



   Name:  JESÚS DAI                    : 1935    Attend Dr: Darion Leone MD



   Acct:  [id number]  Unit: [id number]  AGE: 80            Location:  [facility]



   Reg:   10/09/15                        SEX: M             Status:    REG REF



   -----------------------------------------------------------------------------
---------------



   



   SPEC: [id number]         HUMA:   10/09/15-    [facility] DR: Darion Leone MD



   REQ:  [id number]              RECD:   10/09/



   STATUS: ÁNGELA JESUS DR: Susan Holm MD



   _



   SOURCE: URINE          SPDESC:



   ORDERED:  Urine Culture



   



   -----------------------------------------------------------------------------
---------------



   Procedure                         Result                         Verified   
        Site



   -----------------------------------------------------------------------------
---------------



   Urine Culture  Final                                             10/11/15-
1349      ML



   No Growth Day 2 (<1,000 CFU/mL)



   



   -----------------------------------------------------------------------------
---------------



   * ML - MAIN LAB ([facility])



   .



   



   



   



   



   



   



   



   



   



   



   



   



   



   



   



   



   



   



   



   



   



   



   



   



   



   



   



   ** END OF REPORT **



   



   * ML=Testing performed at Main Lab



   DEPARTMENT OF PATHOLOGY,  [facility] [employer] [place], [place] [address]



   Phone # [phone]      Fax #[phone]



   Floyd Vieira M.D. Director     [facility] # [id number]

 

 41  RUN DATE: 04/13/15               [facility] **LIVE**       
         PAGE    1



   RUN TIME: 936             [facility] [address] [place], [place]   [address]



   Specimen Inquiry



   



   -----------------------------------------------------------------------------
---------------



   Name:  JESÚS DAI                    : 1935    Attend Dr: Garry Souza MD



   Acct:  [id number]  Unit: [id number]  AGE: 80            Location:  [facility]



   Re/09/15                        SEX: M             Status:    REG SDC



   -----------------------------------------------------------------------------
---------------



   



   SPEC: [id number]             HUMA: 04/09/      [facility] DR: Garry Souza MD



   REQ:  [id number]             RECD: 04/09/



   STATUS: KARISSA JESUS DR: Darion Leone MD



   _



   ORDERED:  LEVEL IV/2



   



   FINAL DIAGNOSIS



   



   



   1.  Skin, left forehead, wide excision:



   -- Sebaceous skin with scar and exuberant prior surgical site related 
changes.



   -- No residual melanocytic neoplasia identified.



   -- Extensive actinic change



   



   2.   Skin, right temple, reexcision:



   -- Sebaceous skin with scar and exuberant prior surgical site related 
changes.



   -- No residual basal cell carcinoma identified.



   -- Extensive actinic change.



   -- Seborrheic keratosis, incidental.



   



   



   PRE-OPERATIVE DIAGNOSIS



   



   1) Melanoma in situ left forehead, suture marks 12 o'clock superior apex 
margin. 2) Basal



   cell carcinoma right temple, suture marks 12 o'clock anterior apex margin



   



   GROSS DESCRIPTION



   



   1.   The specimen is received in formalin labeled, Wider Excision Melanoma 
In Situ Left



   Forehead Suture Johnston 12:00 Superior [place] Margin, and consists of a 6.7 x 
1.4 cm tan-gray



   hairbearing wrinkled skin ellipse excised to a depth of 0.5 cm.  There is a 
suture attached



   to one long axis which designates the 12:00 superior apex margin.  The 
specimen is inked as



   follows: 9:00 half black, 3:00 half blue and 12:00 tip green, serially 
sectioned from 12:00



   to 6:00 and entirely submitted in cassettes A through F to include ellipse 
ends in cassette



   A.



   



   2.   The specimen is received in formalin labeled, Reexcision Basal Cell 
Carcinoma Right



   Holiness Suture Marks 12:00 Anterior [place] Margin, and consists of a 5.5 x 0.8 
cm tan-gray



   wrinkled hairbearing skin ellipse excised to a depth of 0.5 cm.  There is a 
suture attached



   to one long axis which designates the 12:00 anterior apex margin.  The 
specimen is inked as



   follows: 9:00 half black, 3:00 half blue and 12:00 tip green, serially 
sectioned from 12:00



   to 6:00 and entirely submitted in cassettes A through E to include ellipse 
ends in cassette



   A.



   



   ** CONTINUED ON NEXT PAGE **



   



   * ML=Testing performed at [facility]



   DEPARTMENT OF PATHOLOGY,  [address] [place] [address]



   Phone # [phone]      Fax #[phone]



   Floyd Vieira M.D. Director     [facility] # [id number]



   



   



   



   RUN DATE: 04/13/15               [facility] **LIVE**         
       PAGE    2



   RUN TIME: 0715 [address] [place], [place]   [address]



   Specimen Inquiry



   



   -----------------------------------------------------------------------------
---------------



   Patient: JESÚS DAI                     [id number]     (Continued)



   -----------------------------------------------------------------------------
---------------



   



   GROSS DESCRIPTION          (Continued)



   



   



   Signed __________(signature on file)___________ Floyd Vieira MD 04/13/
15 0935



   



   -----------------------------------------------------------------------------
---------------



   



   



   



   



   



   



   



   



   



   



   



   



   



   



   



   



   



   



   



   



   



   



   



   



   



   



   



   



   



   



   



   



   



   



   



   



   



   



   



   ** END OF REPORT **



   



   * ML=Testing performed at Main Lab



   DEPARTMENT OF PATHOLOGY,  [facility] [employer] [place], [place] [address]



   Phone # [phone]      Fax #[phone]



   Floyd Vieira M.D. Director     [facility] # [id number]

 

 42  RUN DATE: 02/03/15               [facility] LAB **LIVE**       
         PAGE    1



   RUN TIME: 194             [address] [place], [place]   [address]



   Specimen Inquiry



   



   -----------------------------------------------------------------------------
---------------



   Name:  JESÚS DAI                    : 1935    Attend Dr: Garry Souza MD



   Acct:  [id number]  Unit: [id number]  AGE: 79            Location:  [facility]



   Re/29/15                        SEX: M             Status:    REG SDC



   -----------------------------------------------------------------------------
---------------



   



   SPEC:               HUMA: 01/29/15-          SUBM DR: Garry Souza MD



   REQ:  [id number]             RECD: 01/29/



   STATUS: KARISSA JESUS DR: Darion Leone MD



   _



   ORDERED:  MB-45 (HMB-45), MELAN-A STAIN, LEVEL IV/2, SOX10, SOX10-ADD, MB45-
ADD,



   MELAN-A-ADD



   ** THIS IS A CORRECTED REPORT **  02/03/



   Corrected Report



   



   FINAL DIAGNOSIS



   



   



   1.  Skin, left forehead, wide excision:



   -- Residual melanoma in situ.



   -- 9 mm greatest measured span.



   -- Melanoma in situ present along the 3 to 6:00 and 6 to 9:00 margin (slide 
D and E).  See



   comment.



   



   2.   Skin, right temple, excision:



   -- Well-differentiated squamous cell carcinoma, keratoacanthoma type.



   -- Deep tip and lateral margins of resection clear of squamous cell 
carcinoma.



   -- Incidental basal cell carcinoma, nodular type, transected at 9:00 lateral 
margin.



   



   



   



   COMMENT:



   Immunochemical stains for Sox 10, HMB 45 and Melan-A were



   performed on blocks D and E with appropriate control.  Both



   demonstrate neoplastic melanocytes in small nests and single



   cells at the dermal-epidermal junction and within the



   epidermis extending to the lateral margins as described above.



   No invasion is identified.



   



   An incidental small focus of basal cell carcinoma is noted in



   parts 2 at the 9:00 lateral margin as noted above.



   



   Dr. Yousif has reviewed this case and concurs.



   



   



   



   



   



   



   ** CONTINUED ON NEXT PAGE **



   



   * ML=Testing performed at Main Lab



   DEPARTMENT OF PATHOLOGY,  [facility] [place], [place] [address]



   Phone # [phone]      Fax #[phone]



   Floyd Vieira M.D. Director     [facility] # [id number]



   



   



   



   RUN DATE: 02/03/15               [facility] **LIVE**         
       PAGE    2



   RUN TIME:              [facility] [place], [place]   [address]



   Specimen Inquiry



   



   -----------------------------------------------------------------------------
---------------



   Patient: JESÚS DAI                     [id number]     (Continued)



   -----------------------------------------------------------------------------
---------------



   



   PRE-OPERATIVE DIAGNOSIS       (Continued)



   



   



   PRE-OPERATIVE DIAGNOSIS



   



   1. Melanoma in situ left forehead, suture marks 12 o'clock superior apex 
margin, 2.



   Probable



   keratoacanthoma right temple, suture marks 12 o'clock anterior apex margin



   



   GROSS DESCRIPTION



   



   1.   The specimen is received in formalin labeled, Wide Excision Melanoma In 
Situ Left



   Forehead Suture Johnston 12:00 Superior [place] Margin, and consists of a 4.8 x 
1.0 cm tan-white



   wrinkled hairbearing skin ellipse excised to a depth of 0.4 cm.  There is a 
suture attached



   to one long axis which designates the 12:00 superior apex margin.  There is 
an eccentric 0.4



   x 0.4 x 0.1 cm granular area associated with the superior 3:00 margin.  The 
specimen is



   inked as follows: 9:00 half black, 3:00 half blue and 12:00 tip green, 
serially sectioned



   from 12:00 to 6:00 and entirely submitted in cassettes A through E to 
include ellipse ends



   in cassette A.



   



   2.   The specimen is received in formalin labeled, Excision Probable 
Keratoacanthoma Right



   Holiness Suture Marks 12:00 Anterior [place] Margin, and consists of a 5.5 x 2.1 
cm tan-gray



   hairbearing skin ellipse excised to a depth of 0.6 cm.  There is a suture 
attached to one



   long axis which designates the 12:00 anterior apex margin.  There is a 
central 1.6 x 1.2 x



   0.7 cm tan-pink to brown centrally crusted nodule.  Additionally there is a 
0.4 x 0.3 x 0.1



   cm tan-white granular area, 0.3 cm posterior to the nodule.  The specimen is 
inked as



   follows: 9:00 half black, 3:00 half blue and 12:00 tip green, serially 
sectioned from 12:00



   to 6:00 and entirely submitted in cassettes A through F to include ellipse 
ends in cassette



   A.



   



   Signed __________(signature on file)___________ Floyd Vieira MD 02/03/
15 1412



   



   -----------------------------------------------------------------------------
---------------



   



   



   



   



   



   



   



   



   



   



   



   



   ** END OF REPORT **



   



   * ML=Testing performed at Main Lab



   DEPARTMENT OF PATHOLOGY,  [address] [place] [address]



   Phone # [phone]      Fax #[phone]



   Floyd Vieira M.D. Director     [facility] # [id number]

 

 43  RUN DATE: 14               [facility] **LIVE**       
         PAGE    1



   RUN TIME: 3490             [address] [place] [address]   [address]



   Specimen Inquiry



   



   -----------------------------------------------------------------------------
---------------



   Name:  JESÚS DAI                    : 1935    Attend Dr: Garry Souza MD



   Acct:  [id number]  Unit: [id number]  AGE: 79            Location:  [facility]



   Re14                        SEX: M             Status:    REG REF



   -----------------------------------------------------------------------------
---------------



   



   SPEC: [id number]             HUMA:       [facility] DR: Garry Souza MD



   REQ:  [id number]             RECD: 



   STATUS: KARISSA JESUS DR: Darion Leone MD



   _



   ORDERED:  MB-45 (HMB-45)/2, MELAN-A STAIN/2, LEVEL IV



   



   FINAL DIAGNOSIS



   



   Skin, left forehead, excision:



   A. Lentigo maligna melanoma.



   B. Span:  9.0 mm.



   C. No invasion identified.



   D. Margins - Lesion is present at the unoriented mid lateral margin of 
resection.



   E. No invasive melanoma identified.



   F. Incidental seborrheic keratosis-like change.



   



   



   



   



   COMMENTS:



   



   Immunohistochemical stains for Melan-A and HMB45 were



   performed on blocks D and C and support the above rendered



   diagnosis.



   



   



   



   CLINICAL HISTORY



   



   Irregular pigmented lesion.



   



   PRE-OPERATIVE DIAGNOSIS



   



   Rule out lentigo maligna



   



   GROSS DESCRIPTION



   



   The specimen is received in formalin labeled Jesús Dai, Excision Skin 
Lesion Left



   Forehead, and consists of a 2.6 x 1.1 cm. tan-grey hair bearing unoriented 
skin ellipse



   excised to a depth of 0.3 cm. There is a central 0.8 x 0.6 cm. brown 
irregular area. The



   specimen is inked, serially sectioned and entirely submitted in cassettes A 
through C to



   include ellipse ends in cassette A.



   



   ** CONTINUED ON NEXT PAGE **



   



   * ML=Testing performed at Main Lab



   DEPARTMENT OF PATHOLOGY,  [facility] [place] [address]



   Phone # [phone]      Fax #[phone]



   Floyd Vieira M.D. Director     [facility] # [id number]



   



   



   



   RUN DATE: 14               [facility] **LIVE**         
       PAGE    2



   RUN TIME: 8177             [facility] [address] [place], [place]   [address]



   Specimen Inquiry



   



   -----------------------------------------------------------------------------
---------------



   Patient: JESÚS DAI                     [id number]     (Continued)



   -----------------------------------------------------------------------------
---------------



   



   GROSS DESCRIPTION          (Continued)



   



   



   Signed __________(signature on file)___________ Floyd Vieira MD  5418



   



   -----------------------------------------------------------------------------
---------------



   



   



   



   



   



   



   



   



   



   



   



   



   



   



   



   



   



   



   



   



   



   



   



   



   



   



   



   



   



   



   



   



   



   



   



   



   



   



   



   ** END OF REPORT **



   



   * ML=Testing performed at Main Lab



   DEPARTMENT OF PATHOLOGY,  [address] [place] [address]



   Phone # [phone]      Fax #[phone]



   Floyd Vieira M.D. Director     [facility] # [id number]

 

 44  Serum levels of PSA measured using the Sita Keke



   DXI Hybritech immunoassay should not be interpreted as



   absolute evidence of the presence or absence of disease.



   The PSA value should be used in conjunction with other



   pertinent clinical diagnostic procedures.



   



   A PSA value in the range of 0.1 to 0.6 ng/ml is



   indeterminate if being used as an indicator of recurrent or



   residual disease.



   



   The values obtained with different assay methods or kits



   cannot be used interchangeably.

 

 45  **Potassium reference range changed effective 14**

 

 46  *******Because ethnic data is not always readily available,



   this report includes an eGFR for both -Americans and



   non- Americans.****



   The National Kidney Disease Education Program (NKDEP) does



   not endorse the use of the MDRD equation for patients that



   are not between the ages of 18 and 70, are pregnant, have



   extremes of body size, muscle mass, or nutritional status,



   or are non- or non-.



   According to the National Kidney Foundation, irrespective of



   diagnosis, the stage of the disease is based on the level of



   kidney function:



   Stage Description                      GFR(mL/min/1.73 m(2))



   1     Kidney damage with normal or decreased GFR       90



   2     Kidney damage with mild decrease in GFR          60-89



   3     Moderate decrease in GFR                         30-59



   4     Severe decrease in GFR                           15-29



   5     Kidney failure                       <15 (or dialysis)

 

 47  Desirable <150



   Borderline high 150-199



   High 200-499



   Very High >500

 

 48  Desirable <200



   Borderline high 200-239



   High >239

 

 49  Low <40



   Desirable: 40-60



   High: >60

 

 50  Desirable <100



   Near Optimal 100-129



   Borderline high 130-159



   High 160-189



   Very High >189







Procedures







 Description

 

 No Information Available







Encounters







 Type  Date  Location  Provider  Dx  Diagnosis

 

 Office Visit  2018  Main Office  Darion Leone,  B34.9  Viral infection
,



   3:40p    M.D.    unspecified









 F01.50  Vascular dementia without behavioral disturbance









 Office Visit  2018  4:30p  Main Office  Darion Leone,  F01.50  
Vascular dementia



       M.D.    without behavioral



           disturbance









 I10  Essential (primary) hypertension

 

 N40.0  Benign prostatic hyperplasia without lower urinry tract symp

 

 I25.10  Athscl heart disease of native coronary artery w/o ang pctrs









 Office Visit  2018  4:40p  Main Office  Darion Leone,  F01.50  
Vascular dementia



       M.D.    without behavioral



           disturbance









 I10  Essential (primary) hypertension

 

 N40.0  Benign prostatic hyperplasia without lower urinry tract symp

 

 I25.10  Athscl heart disease of native coronary artery w/o ang pctrs









 Office Visit  2017  3:30p  Main Office  Valentina Sierra  N39.42  Incontinence



       Barrow, NP    without sensory



           awareness









 K56.41  Fecal impaction









 Office Visit  2017  2:40p  Main Office  Darion Leone,  F01.50  
Vascular dementia



       M.D.    without behavioral



           disturbance









 S06.6x0S  Traum subrac hem w/o loss of consciousness, sequela

 

 I10  Essential (primary) hypertension

 

 I25.10  Athscl heart disease of native coronary artery w/o ang pctrs

 

 N40.0  Benign prostatic hyperplasia without lower urinry tract symp

 

 K21.9  Gastro-esophageal reflux disease without esophagitis

 

 E78.1  Pure hyperglyceridemia









 Office Visit  06/15/2017  5:30p  Main Office  Darion Leone,  F01.50  
Vascular dementia



       M.D.    without behavioral



           disturbance









 I10  Essential (primary) hypertension

 

 I25.10  Athscl heart disease of native coronary artery w/o ang pctrs

 

 N40.0  Benign prostatic hyperplasia without lower urinry tract symp









 Office Visit  2017  Main Office  Keri  S06.6x0S  Traum subrac hem w/
o



   3:30p    Naresh, FNP    loss of



           consciousness,



           sequela









 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









 Office Visit  2017  5:30p  Main Office  Darion Leone,  I25.10  Athscl 
heart



       M.D.    disease of native



           coronary artery



           w/o ang pctrs









 I10  Essential (primary) hypertension

 

 N40.0  Benign prostatic hyperplasia without lower urinry tract symp

 

 F01.50  Vascular dementia without behavioral disturbance









 Office Visit  12/15/2016  3:30p  [facility]  Miranda De La Rosaigne,  F02.81  
Dementia in oth



       Afnp-C    diseases classd



           elswhr w



           behavioral



           disturb









 K59.00  Constipation, unspecified

 

 H61.21  Impacted cerumen, right ear









 Office Visit  2016  2:30p  [facility] Office  Darion Leone,  Z00.01  
Encounter for



       M.D.    general adult



           medical exam w



           abnormal findings









 I25.10  Athscl heart disease of native coronary artery w/o ang pctrs

 

 I10  Essential (primary) hypertension

 

 K21.9  Gastro-esophageal reflux disease without esophagitis

 

 N40.0  Enlarged prostate without lower urinary tract symptoms

 

 F01.50  Vascular dementia without behavioral disturbance

 

 S06.6x0S  Traum subrac hem w/o loss of consciousness, sequela









 Office Visit  2016  4:20p  [facility] Office  Darion Leone M.D.  R63.0  
Anorexia









 R41.82  Altered mental status, unspecified









 Office Visit  2016 10:00a  Main Office  Pennie Guillermo,  F02.81  Dementia 
in ot



       FNP    diseases classd



           elswhr w behavioral



           disturb

 

 Office Visit  2016  4:40p  [facility] Office  Darion Leone,  I10  Essential 
(primary)



       M.DMassimo    hypertension









 F32.9  Major depressive disorder, single episode, unspecified

 

 I25.10  Athscl heart disease of native coronary artery w/o ang pctrs

 

 K21.9  Gastro-esophageal reflux disease without esophagitis

 

 N40.0  Enlarged prostate without lower urinary tract symptoms

 

 E78.1  Pure hyperglyceridemia









 Office Visit  10/01/2015  2:50p  [facility] Office  Darion Leone,  F32.9  Major 
depressive



       M.D.    disorder, single



           episode, unspecified









 I25.10  Athscl heart disease of native coronary artery w/o ang pctrs

 

 I10  Essential (primary) hypertension

 

 N40.0  Enlarged prostate without lower urinary tract symptoms

 

 K21.9  Gastro-esophageal reflux disease without esophagitis

 

 Z87.820  Personal history of traumatic brain injury









 Office Visit  2015  3:50p  [facility]  Darion Leone M.D.    Disorder Not



           Elsewhere Spec

 

 Office Visit  2015  4:15p  [facility] Office  Kiesha  780.79  Malaise And



       [place],    Fatigue Other



       Afnp-C    

 

 Office Visit  2015  4:00p  [facility] Office  Pennie Mima,  461.0  
Sinusitis Acute



       FNP    Maxillary

 

 Office Visit  2015  4:30p  [facility] Office  Darion PAVON  [address]  Codie Leone M.D.    Disorder Not



           Elsewhere Spec









 414.00  Coronary Atherosclerosis Unspec Type Vessel Native/Graft

 

 600.00  hypertrophy benign of prostate without urinary obstruction

 

 530.81  Esophageal Reflux









 Office Visit  2015  [facility]  Darion PAVON  414.00  Coronary



   4:30p  Office  PRESTON Leone    Atherosclerosis



           Unspec Type Vessel



           Native/Graft









 600.00  hypertrophy benign of prostate without urinary obstruction

 

 530.81  Esophageal Reflux

 

 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









 Office Visit  10/07/2014  [facility]  Darion PAVON  414.00  Coronary



   10:00a  Office  PRESTON Leone    Atherosclerosis



           Unspec Type Vessel



           Native/Graft









 600.00  hypertrophy benign of prostate without urinary obstruction

 

 530.81  Esophageal Reflux

 

 V15.52  Personal History Of Traumatic Brain Injury









 Office Visit  2014  [facility]  Darion PAVON  414.00  Coronary



   10:20a  Office  PRESTON Leone    Atherosclerosis



           Unspec Type Vessel



           Native/Graft









 600.00  hypertrophy benign of prostate without urinary obstruction

 

 530.81  Esophageal Reflux

 

 782.9  Skin & Integumentary Tissue Other Symptoms







Plan of Treatment

Future Appointment(s):2019  4:20 pm - Darion Leone M.D. at Main 
Ktoiiw85/10/2019 - Nadia Wong, NPF01.51 Vascular dementia with 
behavioral disturbanceNew Labs:Urine Culture And Sensitivities, Ordered: 01/10/
19Urinalysis Profile, Ordered: 01/10/19Comments:Will start some testing to look 
for infectious gfmzcgQ51.0 Benign prostatic hyperplasia without lower urinary 
tract symR50.9 Fever, unspecifiedNew Labs:CBC Electronic (Fma New), Ordered: 01/
10/19Comp Metabolic, Ordered: 01/10/19CRP, Ordered: 01/10/19Urinalysis Profile, 
Ordered: 01/10/19New Xrays:Chest 2 Views, Ordered: 01/10/19Comments:Get chest x-
ray done; we can consider head imaging if symptoms persist.AllComments:1.  
Patient has been queried about patient's goals/preferences and functional/
lifestyle goals at relevant visits.  If relevant, describe: Has been discussed, 
noted above2.  Treatment goals as explainedto the patient: see above3.  Are 
there barriers to meeting treatment goals?  Yes    If Yes, please describe: 
Barriers include possible insurance limits, disease process, and difficulty 
with lifestyle changes4.  Self-Management goals as described to the patient: Yes
, see above As always, we strongly encourage a healthy diet and making physical 
activity a part of your every day life. If you have questions about how or 
where to start, please contact the office.

## 2019-04-09 ENCOUNTER — HOSPITAL ENCOUNTER (INPATIENT)
Dept: HOSPITAL 25 - ED | Age: 84
LOS: 4 days | Discharge: HOME | DRG: 723 | End: 2019-04-13
Attending: HOSPITALIST | Admitting: INTERNAL MEDICINE
Payer: MEDICARE

## 2019-04-09 DIAGNOSIS — K21.9: ICD-10-CM

## 2019-04-09 DIAGNOSIS — N40.0: ICD-10-CM

## 2019-04-09 DIAGNOSIS — Z87.440: ICD-10-CM

## 2019-04-09 DIAGNOSIS — N20.0: ICD-10-CM

## 2019-04-09 DIAGNOSIS — Z79.82: ICD-10-CM

## 2019-04-09 DIAGNOSIS — Z88.8: ICD-10-CM

## 2019-04-09 DIAGNOSIS — Z96.651: ICD-10-CM

## 2019-04-09 DIAGNOSIS — B34.9: Primary | ICD-10-CM

## 2019-04-09 DIAGNOSIS — H40.9: ICD-10-CM

## 2019-04-09 DIAGNOSIS — Z86.73: ICD-10-CM

## 2019-04-09 DIAGNOSIS — I10: ICD-10-CM

## 2019-04-09 DIAGNOSIS — H54.40: ICD-10-CM

## 2019-04-09 DIAGNOSIS — I25.10: ICD-10-CM

## 2019-04-09 DIAGNOSIS — Z66: ICD-10-CM

## 2019-04-09 DIAGNOSIS — R65.10: ICD-10-CM

## 2019-04-09 DIAGNOSIS — Z85.828: ICD-10-CM

## 2019-04-09 DIAGNOSIS — I44.0: ICD-10-CM

## 2019-04-09 DIAGNOSIS — Z87.891: ICD-10-CM

## 2019-04-09 DIAGNOSIS — Z95.1: ICD-10-CM

## 2019-04-09 DIAGNOSIS — E78.5: ICD-10-CM

## 2019-04-09 DIAGNOSIS — K80.20: ICD-10-CM

## 2019-04-09 DIAGNOSIS — G93.40: ICD-10-CM

## 2019-04-09 DIAGNOSIS — F03.90: ICD-10-CM

## 2019-04-09 LAB
ALBUMIN SERPL BCG-MCNC: 3.8 G/DL (ref 3.2–5.2)
ALBUMIN/GLOB SERPL: 1.6 {RATIO} (ref 1–3)
ALP SERPL-CCNC: 85 U/L (ref 34–104)
ALT SERPL W P-5'-P-CCNC: 13 U/L (ref 7–52)
ANION GAP SERPL CALC-SCNC: 5 MMOL/L (ref 2–11)
APTT PPP: 27.7 SECONDS (ref 26–36.3)
AST SERPL-CCNC: 9 U/L (ref 13–39)
BASOPHILS # BLD AUTO: 0 10^3/UL (ref 0–0.2)
BUN SERPL-MCNC: 16 MG/DL (ref 6–24)
BUN/CREAT SERPL: 20.3 (ref 8–20)
CALCIUM SERPL-MCNC: 9 MG/DL (ref 8.6–10.3)
CHLORIDE SERPL-SCNC: 103 MMOL/L (ref 101–111)
CK SERPL-CCNC: 58 U/L (ref 10–223)
EOSINOPHIL # BLD AUTO: 0 10^3/UL (ref 0–0.6)
FLUAV RNA SPEC QL NAA+PROBE: NEGATIVE
FLUBV RNA SPEC QL NAA+PROBE: NEGATIVE
GLOBULIN SER CALC-MCNC: 2.4 G/DL (ref 2–4)
GLUCOSE SERPL-MCNC: 102 MG/DL (ref 70–100)
HCO3 SERPL-SCNC: 28 MMOL/L (ref 22–32)
HCT VFR BLD AUTO: 42 % (ref 36–46)
HGB BLD-MCNC: 14.2 G/DL (ref 14–18)
INR PPP/BLD: 1.02 (ref 0.77–1.02)
LYMPHOCYTES # BLD AUTO: 1.4 10^3/UL (ref 1–4.8)
MCH RBC QN AUTO: 29 PG (ref 27–31)
MCHC RBC AUTO-ENTMCNC: 34 G/DL (ref 31–36)
MCV RBC AUTO: 86 FL (ref 80–94)
MONOCYTES # BLD AUTO: 0.8 10^3/UL (ref 0–0.8)
NEUTROPHILS # BLD AUTO: 6.2 10^3/UL (ref 1.5–7.7)
NRBC # BLD AUTO: 0 10^3/UL
NRBC BLD QL AUTO: 0
PLATELET # BLD AUTO: 143 10^3/UL (ref 150–450)
POTASSIUM SERPL-SCNC: 3.7 MMOL/L (ref 3.5–5)
PROT SERPL-MCNC: 6.2 G/DL (ref 6.4–8.9)
RBC # BLD AUTO: 4.89 10^6 /UL (ref 4.18–5.48)
SODIUM SERPL-SCNC: 136 MMOL/L (ref 135–145)
TROPONIN I SERPL-MCNC: 0 NG/ML (ref ?–0.04)
VIT C UR QL: (no result)
WBC # BLD AUTO: 8.4 10^3/UL (ref 3.5–10.8)

## 2019-04-09 PROCEDURE — 80048 BASIC METABOLIC PNL TOTAL CA: CPT

## 2019-04-09 PROCEDURE — 93880 EXTRACRANIAL BILAT STUDY: CPT

## 2019-04-09 PROCEDURE — 85025 COMPLETE CBC W/AUTO DIFF WBC: CPT

## 2019-04-09 PROCEDURE — 87040 BLOOD CULTURE FOR BACTERIA: CPT

## 2019-04-09 PROCEDURE — G0378 HOSPITAL OBSERVATION PER HR: HCPCS

## 2019-04-09 PROCEDURE — 84484 ASSAY OF TROPONIN QUANT: CPT

## 2019-04-09 PROCEDURE — 80053 COMPREHEN METABOLIC PANEL: CPT

## 2019-04-09 PROCEDURE — 85730 THROMBOPLASTIN TIME PARTIAL: CPT

## 2019-04-09 PROCEDURE — 86140 C-REACTIVE PROTEIN: CPT

## 2019-04-09 PROCEDURE — 71046 X-RAY EXAM CHEST 2 VIEWS: CPT

## 2019-04-09 PROCEDURE — 76705 ECHO EXAM OF ABDOMEN: CPT

## 2019-04-09 PROCEDURE — 71045 X-RAY EXAM CHEST 1 VIEW: CPT

## 2019-04-09 PROCEDURE — 85610 PROTHROMBIN TIME: CPT

## 2019-04-09 PROCEDURE — 36415 COLL VENOUS BLD VENIPUNCTURE: CPT

## 2019-04-09 PROCEDURE — 83880 ASSAY OF NATRIURETIC PEPTIDE: CPT

## 2019-04-09 PROCEDURE — 70450 CT HEAD/BRAIN W/O DYE: CPT

## 2019-04-09 PROCEDURE — 93005 ELECTROCARDIOGRAM TRACING: CPT

## 2019-04-09 PROCEDURE — 87641 MR-STAPH DNA AMP PROBE: CPT

## 2019-04-09 PROCEDURE — 83605 ASSAY OF LACTIC ACID: CPT

## 2019-04-09 PROCEDURE — 99285 EMERGENCY DEPT VISIT HI MDM: CPT

## 2019-04-09 PROCEDURE — 82550 ASSAY OF CK (CPK): CPT

## 2019-04-09 PROCEDURE — 81003 URINALYSIS AUTO W/O SCOPE: CPT

## 2019-04-09 PROCEDURE — 74176 CT ABD & PELVIS W/O CONTRAST: CPT

## 2019-04-09 RX ADMIN — TIMOLOL MALEATE SCH DROP: 2.5 SOLUTION OPHTHALMIC at 23:36

## 2019-04-09 NOTE — XMS REPORT
Continuity of Care Document (CCD)

 Created on:April 3, 2019



Patient:Santo Dai

Sex:Male

:1935

External Reference #:[id number]





Demographics







 Address  [address] [place] [address]

 

 Home Phone  [phone]

 

 Preferred Language  en

 

 Marital Status  Not  or 

 

 Zoroastrianism Affiliation  Unknown

 

 Race  White

 

 Ethnic Group  Not  or 









Author







 Name  Isidoro Mojica M.D.

 

 Address  [address] [place] [address]









Support







 Name  Relationship  Address  Phone

 

 Antonella Dai  Wife  Unavailable  [phone]









Care Team Providers







 Name  Role  Phone

 

 Darion Leone M.D.  Primary Care Physician  Unavailable









Payers







 Date  Identification Numbers  Payment Provider  Subscriber

 

   Policy Number: [id number]  [employer]  Antonella Dai









 PayID: [id number]  [address] [address]









 [place] [address]







Advance Directives







 Description

 

 No Information Available







Problems







 Active Problems  Provider  Date

 

 Essential hypertension    Onset: 

 

 Subarachnoid hemorrhage    Onset: 









 Note: 









 Rubeosis iridis  Brigida Foley O.D.  Onset: 2019







Family History







 Date  Family Member(s)  Observation  Comments

 

   Father  No Current Problems  

 

   Mother  No Current Problems  







Social History







 Type  Date  Description  Comments

 

 Birth Sex    Unknown  

 

 Marital Status    Legal Status:   

 

 Occupation    None  

 

 Work Status    Retired  

 

 ETOH Use    Denies alcohol use  

 

 Tobacco Use  Start: Unknown End: Unknown  Patient is a former smoker  

 

 Recreational Drug Use    Denies Drug Use  

 

 Smoking Status  Reviewed: 19  Patient is a former smoker  







Allergies, Adverse Reactions, Alerts







 Description

 

 No Known Drug Allergies







Medications







 Active Medications  SIG  Qnty  Indications  Ordering Provider  Date

 

 Timolol Maleate  use 1 drop in  10ml  H40.52x3  Isidoro Mojica,  2019



             0.25%  Left eye 2 times      M.D.  



 Solution  a day        



           

 

 Amlodipine Besylate        Darlow, Darion A  



                 2.5mg        M.D.  



 Tablets          



           

 

 Tamsulosin HCL        Darlow, Darion A  



            0.4mg        M.D.  



 Capsules          



           

 

 Trazodone HCL        Darlow, Darion A  



           100mg Tablets        M.D.  



           

 

 Atorvastatin Calcium        Darlow, Darion A  



                  20mg        M.D.  



 Tablets          



           







Immunizations







 Description

 

 No Information Available







Vital Signs







 Description

 

 No Information Available







Results







 Description

 

 No Information Available







Procedures







 Description

 

 No Information Available







Encounters







 Description

 

 No Information Available







Plan of Treatment

2019 - Brigida Foley O.D.H40.52x3 Glaucoma secondary to other eye 
disorders, left eye, severe stageNew Medication:Timolol Maleate 0.25 % - use 1 
drop in Left eye 2 times a dayNew Xrays:Ultrasound Carotic Bilateral, Ordered: 
19Comments:Smoking can increase the risk of developing or worsening any 
eye related disease, as well as affect your overall health.  If you are a smoker
, we strongly recommend that you quit.If you are not a smoker, we strongly 
recommend that you do not start.  I WILL ORDER A CAROTID ULTRASOUND TO LOOK FOR 
A REASONS THIS HAPPENED TO THE LEFT EYEUSE THE GLAUCOMA EYE DROP TIMOLOL ONE 
DROP, TWICE A DAY TO THE LEFT EYEI WILL CHECK ON YOU AGAIN IN 1 WEEKH25.13 Age-
related nuclear cataract, bilateralComments:You have been diagnosed with 
cataracts. If you are happy with your vision as it is now, then we willsee you 
at your next scheduled appointment. If you feel like your vision is getting 
worse before your scheduled appointment, please call Jennifer or Ana at [phone]
[phone].

## 2019-04-09 NOTE — ED
Progress





- Progress Note


Progress Note: 


Patient signed out from Dr. Wallace, awaiting US gallbladder and EKG, pending 

admission to hospitalist. 





Course/Dx





- Course


Course Of Treatment: 84 year old male brought in by ambulance to [facility] with a 

chief complaint of fever. Patient was signed out from Dr. Wallace, pending US 

gallbladder and EKG, awaiting admission. Patient is not cooperative in ED, 

pushing away staff's hands. Dr. Hernandez, hospitalist, accepts the patient 

for admission. The patient will be admitted to hospitalist.





- Diagnoses


Provider Diagnoses: 


 Fever








Discharge





- Sign-Out/Discharge


Documenting (check all that apply): Patient Departure - Admit


Patient Received Moderate/Deep Sedation with Procedure: No





- Discharge Plan


Condition: Fair


Disposition: ADMITTED TO [facility]





- Attestation Statements


Document Initiated by Scribe: Yes


Documenting Scribe: Freida Serrano


Provider For Whom Scribe is Documenting (Include Credential): Jerardo Carreon MD


Scribe Attestation: 


I, Freida Serrano, scribed for Jerardo Carreon MD on 04/09/19 at 2144. 


Status of Scribe Document: Ready

## 2019-04-09 NOTE — HP
CC:  Darion Leone MD *

 

HISTORY AND PHYSICAL:

 

DATE OF ADMISSION:  04/09/19

 

PRIMARY CARE PROVIDER:  Darion Leone MD

 

ATTENDING PHYSICIAN:  Josie Smart MD * (dictated by JUAN Garcia).

 

CHIEF COMPLAINT:  Fever.

 

HISTORY OF PRESENT ILLNESS:  Mr. Dai is an 84-year-old male with a past 
medical history of dementia, hypertension who presented today via EMS from 
[place] with fever.  It is noted that his temperature was 101.3 at [place], 
102.5 according to EMS, and 100.9 upon arrival to the ER.  The patient has 
dementia and is a very poor historian.  His wife is at his bedside and relays 
much of the story.  She states that much of the day, the patient has been 
irritated, unsteady on his feet, and complaining of not feeling well.  He has 
been shaky with a poor appetite.  It is noted that the patient usually is 
unsteady on his feet.  He will not use a cane or walker because of his dementia
, but today he appeared more unsteady than usual.  He often times is a 
difficult eater, but his appetite was worse today and he was noted to have 
increased agitation.  Staff at his residence noted that he has had an increase 
in urinary incontinence as well as chills.  There is no noted cough, vomiting 
or diarrhea.  When asked the patient denies abdominal pain, but his wife 
reports that he denies all forms of pain due to dementia, therefore a review of 
systems is not performed.  While in the emergency room, the patient received 
ceftriaxone, 1 L of normal saline, and acetaminophen.  His temperature is 
currently 98.6.  He also received laboratory workup and a chest x-ray.

 

PAST MEDICAL HISTORY:

1.  Hypertension.

2.  CAD, CABG x5 in 2003.

3.  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

4.  GERD.

5.  Hyperlipidemia.

6.  BPH.

7.  Dementia.

8.  Left eye glaucoma.

9.  Skin cancer removed x3 from face.

 

PAST SURGICAL HISTORY:

1.  Shunt, 2008.

2.  Right DKA, 2006.

3.  CABG x5, 2003.

4.  Skin cancer removed x3 from face.

5.  Tonsillectomy as a child.

 

HOME MEDICATIONS:

1.  Acetaminophen 500 mg p.o. q.4 hours p.r.n.

2.  Timolol 0.25% ophthalmic solution one drop to the left eye b.i.d.

3.  Metamucil 0.4 g p.o. b.i.d.

4.  Aquaphor Advanced Therapy 41% topically b.i.d.

5.  Docusate 100 mg p.o. b.i.d.

6.  Trazodone 100 mg p.o. q. p.m.

7.  Tamsulosin 0.4 mg p.o. q. p.m.

8.  Atorvastatin 20 mg p.o. q. p.m.

9.  Aspirin 81 mg p.o. q. p.m.

10.  Amlodipine 2.5 mg p.o. q. p.m.

11.  Acetaminophen 500 mg p.o. at bedtime.

 

DRUG ALLERGIES:  HALOPERIDOL.

 

FAMILY HISTORY:  Deferred at this time.

 

SOCIAL HISTORY:  The patient is a former smoker, he smoked approximately half a 
pack per day for about 30 years.  He does not drink alcohol.  He currently 
resides at [place].  His healthcare proxy is his wife, Naima Dai, phone 
number is [phone].

 

                               PHYSICAL EXAMINATION

 

GENERAL:  Mr. Dai is a well developed, well-nourished, slightly overweight, 
elderly white male.  He is lying on his side in bed.  He is unable to find a 
comfortable position.  He appears to be in no acute distress, but is fidgety 
and appears anxious.

 

VITAL SIGNS:  Temperature 100.9, heart rate 83, respiratory rate 24, oxygen 
saturation 91% on room air, blood pressure 138/50.

 

HEENT:  The right pupil is equally round and reactive to light.  The left pupil 
is dilated and nonreactive to light.  The patient is slightly hard of hearing.

 

NECK:  With no lymphadenopathy.

 

RESPIRATORY:  Symmetrical chest expansion with no use of accessory muscles.  
The patient does not follow instructions for deep breathing.  Lungs are clear 
to auscultation without rhonchi, wheezes or rubs.

 

CARDIOVASCULAR:  Regular rate and rhythm with S1, S2 present.  No murmurs, rubs 
or gallops.

 

ABDOMEN:  Attempted to palpate the abdomen and was pushed away.  Unsure if this 
is due to tenderness or agitation.

 

EXTREMITIES:  Skin is warm and smooth bilaterally.  There is no edema, clubbing 
or cyanosis.  Radial and pedal pulses are palpable.

 

NEURO:  The patient is awake and alert.  He moves all of his extremities.

 

 DIAGNOSTIC STUDIES/LAB DATA:  Chest x-ray, 04/09/19, impression:  No active 
cardiopulmonary disease.

 

WBC 8.4, RBC 4.89, HGB 14.2, HCT 42, platelets 143.  Sodium 136, potassium 3.7, 
chloride 103, carbon dioxide 28, anion gap 5, BUN 16, creatinine 0.79, lactic 
acid 1.4, total bilirubin 1.40.  AST 9, ALT 13, alk phos 85, total creatine 
kinase 58. Troponin 0.00.  BNP 77.

 

ASSESSMENT AND PLAN: Mr. Dai is an 84-year-old male with a past medical 
history as described above who presented to the ER today with fever of unknown 
origin.  The patient will be admitted for:

1.  Fever of unknown origin.  The patient's fever has fully trended down.  
First documented fever in the ER was 100.9, he was given Tylenol 950 and he is 
currently at 98.6.  His influenza A and B are negative.  Chest x-ray is within 
normal limits, so a pulmonary source is unlikely.  Upon visual inspection of 
the chest x-ray, there is a questionable area in the left lower lobe at the 
angle.  Will repeat chest x-ray in the a.m.  The patient's urinalysis showed no 
leukocyte esterase, no nitrates, so urinary source is unlikely.  The patient 
was a poor historian and review of systems was unable to be completed.  There 
is a question of whether there is a gallbladder source.  His total bilirubin is 
1.40 and he may have had abdominal tenderness.  A gallbladder ultrasound has 
been ordered and is pending.  The patient did receive ceftriaxone and 
acetaminophen in the ER.

2.  Hypertension.  Continue amlodipine.

3.  Hyperlipidemia.  Continue atorvastatin.

4.  FEN.  The patient was given 1 L of normal saline in the ER.  He will be 
given 1 L of maintenance fluid at 125 per hour x1L.  He has been placed on a 
heart healthy diet.

5.  Code status.  The patient is DNR.  MOLST form has been updated today.

6.  DVT prophylaxis.  According to the DVT Risk Assessment, the patient scored 3
, which is high risk.  He has been placed on Lovenox.

 

TIME SPENT:  Approximately 60 minutes was spent on this admission, greater than 
half that time was spent with the patient and his wife, obtaining history, 
performing physical, and reviewing the plan of care.

 

The case had been reviewed with my attending, Dr. Smart, who is in agreement 
with the plan of care.

 

 ____________________________________ 

MADDIE ZHANG, PA

 

[id number] #: [id number]

MTDD

## 2019-04-09 NOTE — ED
HPI Febrile Illness





- HPI Summary


HPI Summary: 


Patient is a level 5 caveat due to him being uncooperative.


An 85 y/o male brought in by [facility] ambulance presents to [facility] with a chief 

complaint of fever. At triage he rated his pain as a 0/10 in severity and he 

had a temperature of 100.9. Per EMS, the patient also c/o chills and urinary 

incontinence. In the room the patient is not answering questions and is pushing 

people away from examining him. However, it was seen that the legs arent 

swollen.





- History of Current Complaint


Chief Complaint: EDGeneral


Time Seen by Provider: 04/09/19 15:44


Hx Obtained From: Patient, EMS


Onset/Duration: Started Hours Ago, Still Present


Timing: Constant, Lasting Hours


Temperature: 38.3 C


Initial Severity: Mild


Current Severity: Mild


Pain Intensity: 0


Pain Scale Used: 0-10 Numeric


Aggravating Factors: Nothing


Alleviating Factors: Nothing


Associated Signs and Symptoms: Chills, Other: - urinary incontinence





- Additional Pertinent History


Primary Care Physician: BEATRICE





- Allergy/Home Medications


Allergies/Adverse Reactions: 


 Allergies











Allergy/AdvReac Type Severity Reaction Status Date / Time


 


haloperidol [From Haldol] Allergy  Unknown Verified 08/31/18 18:44





   Reaction  





   Details  











Home Medications: 


 Home Medications





Acetaminophen [Acetaminophen Extra Strength] 500 mg PO BEDTIME 04/09/19 [

History Confirmed 04/09/19]


Petrolatum,White [Aquaphor Advanced Therapy] 41 % TOPICAL BID 04/09/19 [History 

Confirmed 04/09/19]


Timolol 0.25% OPHTH.SOLN* [Timoptic Ophth.soln 0.25%] 1 drop LEFT EYE BID 04/09/ 19 [History Confirmed 04/09/19]











PMH/Surg Hx/FS Hx/Imm Hx


Endocrine/Hematology History: 


   Denies: Hx Diabetes


Cardiovascular History: Reports: Hx Aneurysm - brain aneurysm, Hx Angina - NONE 

SINCE 2003, Hx Coronary Artery Disease - CABG X5 2003, Hx Hypertension, Other 

Cardiovascular Problems/Disorders - CABG


   Denies: Hx Pacemaker/ICD


GI History: Reports: Hx Gastroesophageal Reflux Disease - WELL CONTROLLED


 History: Reports: Other  Problems/Disorders - PMhx UTI


   Denies: Hx Renal Disease


Sensory History: Reports: Hx Contacts or Glasses - GLASSES, Hx Hearing Aid - 

RIGHT EAR RARELY USES


Opthamlomology History: Reports: Hx Contacts or Glasses - GLASSES


Neurological History: Reports: Hx CVA, Hx Dementia, Other Neuro Impairments/

Disorders - DEMENTIA, subarachnoid hemorrhage


Psychiatric History: Reports: Other Psychiatric Issues/Disorders - dementia





- Cancer History


Cancer Type, Location and Year: skin cancer





- Surgical History


Surgery Procedure, Year, and Place: shunt-2008- [facility].  RIGHT TKR 2006 [facility].  

CABG X5 2003 [facility].  TONSILLECTOMY AS A CHILD


Hx Anesthesia Reactions: No





- Immunization History


Date of Tetanus Vaccine: 3/2013


Date of Influenza Vaccine: 9/2016


Infectious Disease History: Unable to Obtain/Confirm


Infectious Disease History: 


   Denies: Hx of Known/Suspected MRSA, Traveled Outside the [place] in Last 30 Days





- Family History


Known Family History: Positive: Other - Negative: malignant hyperthermia, 

anesthesia reaction





- Social History


Alcohol Use: None


Hx Substance Use: No


Substance Use Type: Reports: None


Hx Tobacco Use: Yes


Smoking Status (MU): Former Smoker


Type: Cigarettes


Amount Used/How Often: 1/2 PPD X 30 YEARS


Have You Smoked in the Last Year: No





Review of Systems


Positive: Fever - 100.9, Chills


Positive: incontinence


Negative: Edema


All Other Systems Reviewed And Are Negative: No





Physical Exam





- Summary


Physical Exam Summary: 





GENERAL: Patient is a well-developed and nourished M who is lying comfortable 

in the stretcher. Patient is not in any acute respiratory distress.


HEAD AND FACE: Normocephalic


EYES: PERRLA, EOMI x 2.


EARS: Hearing grossly intact.


MOUTH: Oropharynx within normal limits.


NECK: Supple, trachea is midline, no adenopathy, no JVD, no carotid bruit.


CHEST: Symmetric, no tenderness at palpation


LUNGS: Clear to auscultation bilaterally. No wheezing or crackles.


CVS: Regular rate and rhythm, S1 and S2 present, no murmurs or gallops 

appreciated.


ABDOMEN: Soft, non-tender. Bowel sounds are normal. No abdominal abnormal 

pulsations.


EXTREMITIES: Full ROM in all major joints, no edema, no cyanosis or clubbing.


NEURO: Alert and oriented x 3. No acute neurological deficits. Speech is normal 

and follows commands.


SKIN: Dry and warm


Triage Information Reviewed: Yes


Vital Signs On Initial Exam: 


 Initial Vitals











Temp Pulse Resp BP Pulse Ox


 


 100.9 F   99   18   173/62   92 


 


 04/09/19 15:31  04/09/19 15:31  04/09/19 15:31  04/09/19 15:31  04/09/19 15:31











Vital Signs Reviewed: Yes





Diagnostics





- Vital Signs


 Vital Signs











  Temp Pulse Resp BP Pulse Ox


 


 04/09/19 15:31  100.9 F  99  18  173/62  92














- Laboratory


Result Diagrams: 


 04/10/19 05:01





 04/10/19 05:01


Lab Statement: Any lab studies that have been ordered have been reviewed, and 

results considered in the medical decision making process.





- Radiology


  ** CXR


Radiology Interpretation Completed By: Radiologist


Summary of Radiographic Findings: NO ACTIVE CARDIOPULMONARY DISEASE.  ED 

physician has reviewed this imaging report.





Re-Evaluation





- Re-Evaluation


  ** First Eval


Re-Evaluation Time: 18:41


Change: Unchanged


Comment: Per wife, the patient is not acting like himself.





Course/Dx





- Course


Course Of Treatment: An 85 y/o male brought in by [facility] ambulance presents to 

[facility] with a chief complaint of fever. The physical exam revealed no edema. CXR 

impression: NO ACTIVE CARDIOPULMONARY DISEASE. Bloodwork, chemistries and 

urines obtained. The patient tested negative for influenza A or influenza B. 

Dr. Hernandez, hospitalist, will accept the patient for admission but wants an 

ultrasound done and recommends sign out to Dr. Carreon. The patient will be 

signed out to Dr. Carreon pending ultrasound and admission.





- Diagnoses


Provider Diagnoses: 


 Fever








- Provider Notifications


Discussed Care Of Patient With: Ced Hernandez


Time Discussed With Above Provider: 19:03


Instructed by Provider To: Other - Dr. Hernandez, hospitalist, will accept the 

patient for admission but wants an ultrasound done and recommends sign out to 

Dr. Carreon.





Discharge





- Sign-Out/Discharge


Documenting (check all that apply): Sign-Out Patient


Signing out patient TO: Jerardo Carreon - pending ultrasound


Patient Received Moderate/Deep Sedation with Procedure: No





- Discharge Plan


Condition: Fair


Disposition: ADMITTED TO [facility]





- Billing Disposition and Condition


Condition: FAIR


Disposition: Admitted to [facility]





- Attestation Statements


Document Initiated by Scribe: Yes


Documenting Scribe: Dion Raymond


Provider For Whom Scribe is Documenting (Include Credential): Kade Wallace MD


Scribe Attestation: 


Dion ARZOLA, scribed for Kade Wallace MD on 04/10/19 at 1246. 


Scribe Documentation Reviewed: Yes


Provider Attestation: 


The documentation as recorded by the scribe, Dion Raymond accurately 

reflects the service I personally performed and the decisions made by me, Jeffrey Wallace MD


Status of Scribe Document: Viewed

## 2019-04-10 LAB
ALBUMIN SERPL BCG-MCNC: 3.3 G/DL (ref 3.2–5.2)
ALBUMIN/GLOB SERPL: 1.7 {RATIO} (ref 1–3)
ALP SERPL-CCNC: 74 U/L (ref 34–104)
ALT SERPL W P-5'-P-CCNC: 11 U/L (ref 7–52)
ANION GAP SERPL CALC-SCNC: 5 MMOL/L (ref 2–11)
AST SERPL-CCNC: 11 U/L (ref 13–39)
BASOPHILS # BLD AUTO: 0 10^3/UL (ref 0–0.2)
BUN SERPL-MCNC: 14 MG/DL (ref 6–24)
BUN/CREAT SERPL: 21.5 (ref 8–20)
CALCIUM SERPL-MCNC: 8.5 MG/DL (ref 8.6–10.3)
CHLORIDE SERPL-SCNC: 106 MMOL/L (ref 101–111)
EOSINOPHIL # BLD AUTO: 0.1 10^3/UL (ref 0–0.6)
GLOBULIN SER CALC-MCNC: 2 G/DL (ref 2–4)
GLUCOSE SERPL-MCNC: 104 MG/DL (ref 70–100)
HCO3 SERPL-SCNC: 25 MMOL/L (ref 22–32)
HCT VFR BLD AUTO: 39 % (ref 36–46)
HGB BLD-MCNC: 13 G/DL (ref 14–18)
LYMPHOCYTES # BLD AUTO: 1.5 10^3/UL (ref 1–4.8)
MCH RBC QN AUTO: 29 PG (ref 27–31)
MCHC RBC AUTO-ENTMCNC: 34 G/DL (ref 31–36)
MCV RBC AUTO: 86 FL (ref 80–94)
MONOCYTES # BLD AUTO: 0.9 10^3/UL (ref 0–0.8)
NEUTROPHILS # BLD AUTO: 5.1 10^3/UL (ref 1.5–7.7)
NRBC # BLD AUTO: 0 10^3/UL
NRBC BLD QL AUTO: 0
PLATELET # BLD AUTO: 137 10^3/UL (ref 150–450)
POTASSIUM SERPL-SCNC: 3.7 MMOL/L (ref 3.5–5)
PROT SERPL-MCNC: 5.3 G/DL (ref 6.4–8.9)
RBC # BLD AUTO: 4.48 10^6 /UL (ref 4.18–5.48)
SODIUM SERPL-SCNC: 136 MMOL/L (ref 135–145)
WBC # BLD AUTO: 7.6 10^3/UL (ref 3.5–10.8)

## 2019-04-10 RX ADMIN — ASPIRIN SCH MG: 81 TABLET, CHEWABLE ORAL at 17:40

## 2019-04-10 RX ADMIN — TIMOLOL MALEATE SCH DROP: 2.5 SOLUTION OPHTHALMIC at 11:48

## 2019-04-10 RX ADMIN — TIMOLOL MALEATE SCH DROP: 2.5 SOLUTION OPHTHALMIC at 20:04

## 2019-04-10 RX ADMIN — DOCUSATE SODIUM SCH MG: 100 CAPSULE, LIQUID FILLED ORAL at 11:48

## 2019-04-10 RX ADMIN — ENOXAPARIN SODIUM SCH MG: 40 INJECTION SUBCUTANEOUS at 01:46

## 2019-04-10 RX ADMIN — TAMSULOSIN HYDROCHLORIDE SCH MG: 0.4 CAPSULE ORAL at 17:40

## 2019-04-10 RX ADMIN — ATORVASTATIN CALCIUM SCH MG: 20 TABLET, FILM COATED ORAL at 17:40

## 2019-04-10 RX ADMIN — ENOXAPARIN SODIUM SCH MG: 40 INJECTION SUBCUTANEOUS at 23:00

## 2019-04-10 RX ADMIN — DOCUSATE SODIUM SCH MG: 100 CAPSULE, LIQUID FILLED ORAL at 20:04

## 2019-04-10 RX ADMIN — TRAZODONE HYDROCHLORIDE SCH MG: 100 TABLET ORAL at 20:03

## 2019-04-10 RX ADMIN — AMLODIPINE BESYLATE SCH MG: 5 TABLET ORAL at 17:40

## 2019-04-10 NOTE — PN
Subjective


Date of Service: 04/10/19


Interval History: 





Mr. Dai is nonverbal and unable to provide any subjective information. His 

wife is at the bedside. She feels he is still much more subdued that normal. 

She is surprised that he has been laying quietly in bed and that is not typical 

for him. Otherwise, she thinks he is at his baseline.





Nursing reports a low grade temp this afternoon. Patient did ambulate down the 

hallway with nurse.





Family History: Unchanged from Admission


Social History: Unchanged from Admission


Past Medical History: Unchanged from Admission





Objective


Active Medications: 





Acetaminophen (Tylenol Tab*)  650 mg PO Q4H PRN FEVER/PAIN


Amlodipine Besylate (Norvasc Tab*)  2.5 mg PO QPM GEORGE


Aspirin (Aspirin 81 Mg Chew Tab*)  81 mg PO QPM GEORGE


Atorvastatin Calcium (Lipitor*)  20 mg PO QPM GEORGE


Docusate Sodium (Colace Cap*)  100 mg PO BID GEORGE


Enoxaparin Sodium (Lovenox(*))  40 mg SUBCUT Q24H GEORGE


Tamsulosin HCl (Flomax Cap*)  0.4 mg PO QPM GEORGE


Timolol Maleate (Timoptic Ophth.Soln 0.25%)  1 drop LEFT EYE BID GEORGE


Trazodone HCl (Desyrel Tab*)  100 mg PO QPM [facility]





 Vital Signs - 8 hr











  04/10/19





  15:15


 


Temperature 100.3 F


 


Pulse Rate 77


 


Respiratory 24





Rate 


 


Blood Pressure 134/94





(mmHg) 


 


O2 Sat by Pulse 95





Oximetry 











Oxygen Devices in Use Now: None


Appearance: Elderly male sitting in bed in NAD


Eyes: No Scleral Icterus


Ears/Nose/Mouth/Throat: Mucous Membranes Moist


Neck: NL Appearance and Movements; NL JVP, Trachea Midline


Respiratory: Symmetrical Chest Expansion and Respiratory Effort, Clear to 

Auscultation


Cardiovascular: NL Sounds; No Murmurs; No JVD, RRR


Abdominal: NL Sounds; No Tenderness; No Distention


Extremities: No Edema


Lines/Tubes/Other Access: Clean, Dry and Intact Peripheral IV


Nutrition: Taking PO's


Result Diagrams: 


 04/10/19 05:01





 04/10/19 05:01





Assess/Plan/Problems-Billing





Assessment: 





Mr. Dai is an 83 yo M with PMH of subarachnoid hemorrhage with subsequent 

dementia, HTN, CAD, and glaucoma; who presented to the ED with reports of a 

fever and was admitted because he met SIRS criteria.





- Patient Problems


(1) SIRS (systemic inflammatory response syndrome)


Code(s): R65.10 - SIRS OF NON-INFECTIOUS ORIGIN W/O ACUTE ORGAN DYSFUNCTION   

Comment: 


- Met criteria with fever, tachycardia, tachypnea; source of infection unclear


- Low grade temp again this afternoon


- CXR clear; no evidence of UTI or cellulitis


- Continue to monitor overnight; should be fever free for 24 hr prior to d/c   





(2) Glaucoma


Code(s): H40.9 - UNSPECIFIED GLAUCOMA   Comment: 


- Left eye; pupils unequal which is his baseline


- May be a contributing factor to recent falls


- Continue timolol   





(3) HTN (hypertension)


Code(s): I10 - ESSENTIAL (PRIMARY) HYPERTENSION   Comment: 


- Normotensive, -130s


- Continue amlodipine   





(4) CAD (coronary artery disease)


Code(s): I25.10 - ATHSCL HEART DISEASE OF NATIVE CORONARY ARTERY W/O ANG PCTRS 

  Comment: 


- Continue aspirin, atorvastatin   





(5) Dementia


Code(s): F03.90 - UNSPECIFIED DEMENTIA WITHOUT BEHAVIORAL DISTURBANCE   Comment

: 


- Supportive care


- Continue trazodone and supportive care   





(6) DVT prophylaxis


Comment: 


- Lovenox   





(7) DNR (do not resuscitate)


Comment: 


   


Status and Disposition: 





Observation for fever. Anticipate d/c when 24 hr fever free. Unclear if he can 

return to [place].





Attending: Julia Maldonado

## 2019-04-11 LAB
ALBUMIN SERPL BCG-MCNC: 3.2 G/DL (ref 3.2–5.2)
ALBUMIN/GLOB SERPL: 1.5 {RATIO} (ref 1–3)
ALP SERPL-CCNC: 66 U/L (ref 34–104)
ALT SERPL W P-5'-P-CCNC: 11 U/L (ref 7–52)
ANION GAP SERPL CALC-SCNC: 4 MMOL/L (ref 2–11)
AST SERPL-CCNC: 17 U/L (ref 13–39)
BASOPHILS # BLD AUTO: 0.1 10^3/UL (ref 0–0.2)
BUN SERPL-MCNC: 15 MG/DL (ref 6–24)
BUN/CREAT SERPL: 18.5 (ref 8–20)
CALCIUM SERPL-MCNC: 8.9 MG/DL (ref 8.6–10.3)
CHLORIDE SERPL-SCNC: 105 MMOL/L (ref 101–111)
EOSINOPHIL # BLD AUTO: 0.2 10^3/UL (ref 0–0.6)
GLOBULIN SER CALC-MCNC: 2.2 G/DL (ref 2–4)
GLUCOSE SERPL-MCNC: 96 MG/DL (ref 70–100)
HCO3 SERPL-SCNC: 27 MMOL/L (ref 22–32)
HCT VFR BLD AUTO: 36 % (ref 36–46)
HGB BLD-MCNC: 12.4 G/DL (ref 14–18)
LYMPHOCYTES # BLD AUTO: 2.1 10^3/UL (ref 1–4.8)
MCH RBC QN AUTO: 29 PG (ref 27–31)
MCHC RBC AUTO-ENTMCNC: 34 G/DL (ref 31–36)
MCV RBC AUTO: 86 FL (ref 80–94)
MONOCYTES # BLD AUTO: 1 10^3/UL (ref 0–0.8)
NEUTROPHILS # BLD AUTO: 2.7 10^3/UL (ref 1.5–7.7)
NRBC # BLD AUTO: 0 10^3/UL
NRBC BLD QL AUTO: 0.1
PLATELET # BLD AUTO: 111 10^3/UL (ref 150–450)
POTASSIUM SERPL-SCNC: 3.5 MMOL/L (ref 3.5–5)
PROT SERPL-MCNC: 5.4 G/DL (ref 6.4–8.9)
RBC # BLD AUTO: 4.25 10^6 /UL (ref 4.18–5.48)
SODIUM SERPL-SCNC: 136 MMOL/L (ref 135–145)
WBC # BLD AUTO: 5.9 10^3/UL (ref 3.5–10.8)

## 2019-04-11 RX ADMIN — AMLODIPINE BESYLATE SCH MG: 5 TABLET ORAL at 16:53

## 2019-04-11 RX ADMIN — DOCUSATE SODIUM SCH MG: 100 CAPSULE, LIQUID FILLED ORAL at 08:25

## 2019-04-11 RX ADMIN — TIMOLOL MALEATE SCH DROP: 2.5 SOLUTION OPHTHALMIC at 08:25

## 2019-04-11 RX ADMIN — TIMOLOL MALEATE SCH DROP: 2.5 SOLUTION OPHTHALMIC at 21:28

## 2019-04-11 RX ADMIN — TRAZODONE HYDROCHLORIDE SCH MG: 100 TABLET ORAL at 21:28

## 2019-04-11 RX ADMIN — ENOXAPARIN SODIUM SCH MG: 40 INJECTION SUBCUTANEOUS at 21:27

## 2019-04-11 RX ADMIN — ATORVASTATIN CALCIUM SCH MG: 20 TABLET, FILM COATED ORAL at 16:53

## 2019-04-11 RX ADMIN — SODIUM CHLORIDE SCH MLS/HR: 900 IRRIGANT IRRIGATION at 09:02

## 2019-04-11 RX ADMIN — ASPIRIN SCH MG: 81 TABLET, CHEWABLE ORAL at 16:55

## 2019-04-11 RX ADMIN — TAMSULOSIN HYDROCHLORIDE SCH MG: 0.4 CAPSULE ORAL at 16:55

## 2019-04-11 RX ADMIN — DOCUSATE SODIUM SCH MG: 100 CAPSULE, LIQUID FILLED ORAL at 21:28

## 2019-04-11 NOTE — CONS
CONSULTATION REPORT:

 

DATE OF CONSULT:  04/11/19

 

REQUESTING PROVIDER:  Chely Ibrahim NP

 

CONSULTING SERVICE:  Infectious Disease.

 

REASON FOR CONSULTATION:  Fever.

 

IMPRESSION:

1.  Three days of fever along with declining mental status, encephalopathy.  He 
has had negative blood cultures, negative urinalysis, negative chest x-ray, and 
negative gallbladder ultrasound.  He cannot provide history given his baseline 
dementia.  Other considerations are his right prosthetic knee; however, there 
is no effusion, warmth, or tenderness there, so I do not think that is 
infected.  He does have a ventriculoperitoneal shunt, so that could be infected.

2.  History of subarachnoid hemorrhage with hydrocephalus and  shunt.

3.  Dementia.

4.  Coronary artery disease, status post coronary artery bypass graft.

 

RECOMMENDATION:  We will continue to hold on antibiotics.  Obtain a CT of the 
abdomen and pelvis to look for any collection around the tip of shunt or other 
intraabdominal process.  If that is negative, other considerations are to 
access the shunt for spinal fluid sampling.  The patient's wife is going to 
consider how aggressive shunt to be given his baseline functional status.

 

HISTORY OF PRESENT ILLNESS:  This is an 84-year-old man with a history of 
subarachnoid hemorrhage, hydrocephalus,  shunt, and right knee arthroplasty, 
admitted with fever.  He cannot provide any history, which is obtained from 
cellphone discussion with Chely Ibrahim NP, the patient's wife, and review of 
the medical record.  He was in nursing home, where he was found to be less able 
to care for himself.  He usually feeds himself, could not do that, was not 
ambulating, was more sleepy more of the time, and then found to have a fever of 
102.  He was sent to the ER on 04/09/19, had a white count of 8000 and 
platelets of 143, creatinine 0.8, CRP 82.  Blood cultures were taken and are 
negative at 24 hours.  Urinalysis showed trace ketones and glucose without 
leukocyte esterase, white cells, or red cells.  Influenza PCR was negative.  He 
has had no cough or runny nose that the patient's wife can detect.  Brain CT 
showed no change.  Chest x-ray, no acute process.  Gallbladder ultrasound 
showed stones but no wall thickening or Petty sign.  He had a dose of 
ceftriaxone on 04/09/19.  Yesterday, he had a fever of 39.4.  He is afebrile so 
far this morning.  He is still more drowsy than usual per his wife and has not 
been able to eat or interested in eating.  No diarrhea or rash per the nurse.

 

PAST MEDICAL HISTORY:

1.  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 
and  shunt.

2.  Hypertension.

3.  Coronary artery disease, status post CABG.

4.  Gastroesophageal reflux disease.

5.  Hyperlipidemia.

6.  Benign prostatic hypertrophy.

7.  Dementia.

8.  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

9.  Skin cancer removal from the face.

10.  Status post right knee arthroplasty, 2006.

11.  Status post tonsillectomy.

 

MEDICATIONS:  

1.  Tylenol.

2.  Amlodipine.

3.  Aspirin.

4.  Lipitor.

5.  Docusate.

6.  Enoxaparin.

7.  Ibuprofen as needed.

8.  Tamsulosin.

9.  Timolol, left eye.

10.  Trazodone.

 

ALLERGIES:  To HALDOL.

 

SOCIAL HISTORY:  He lives at [facility].  He has no sick contacts.  He is a 
past smoker, does not drink alcohol.

 

REVIEW OF SYSTEMS:  Unobtainable given his mental status.

 

PHYSICAL EXAM:  Vital Signs:  Temperature 36.6, heart rate 50, respiratory rate 
18, blood pressure 109/32, oxygen saturation 93% on room air.  In general, he 
is not distressed, not diaphoretic.  Neurologic:  He is asleep.  He awakens to 
voice.  He follows some commands, does not answer questions.  He is hard of 
hearing.  HEENT: There is no conjunctival hemorrhage.  Oropharynx without 
lesions.  Neck is supple without mass.  Heart is regular and bradycardic 
without murmurs.  Lungs are clear to auscultation bilaterally.  Abdomen:  Soft, 
nontender, nondistended.  There is no rebound.  Skin:  There is no rash or 
splinter hemorrhage.  Musculoskeletal:  There is no spine tenderness to 
palpation.  There is no joint synovitis.  There is a well- healed right knee 
arthroplasty scar without effusion.

 

DIAGNOSTIC STUDIES/LAB DATA:  Creatinine 0.8, white blood cell count is 5.9, 
hemoglobin 12.4, platelets 111.  Bilirubin is 0.9.  ALT 11.

 

Please see impressions and recommendations outlined above.

 

Thanks for asking me to see Mr. Dai in consultation.

 

 [id number]/CPS #: [id number]

Roc[facility]feller War Demonstration HospitalMARIA DOLORES

## 2019-04-11 NOTE — PN
Subjective


Date of Service: 04/11/19


Interval History: 





Mr. Dai is not able to provide any subjective data, but his wife reports that 

he did admit to her last night that he felt dizzy. She feels as though he is 

still lethargic from his baseline. She reports he looked quite ill when he was 

febrile yesterday, but otherwise does not appear to be in distress. He has been 

up ambulating in the room. He has had poor PO intake this morning.





Nursing expressed concern this morning regarding SIRS criteria and concern for 

infection.





Family History: Unchanged from Admission


Social History: Unchanged from Admission


Past Medical History: Unchanged from Admission





Objective


Active Medications: 





Acetaminophen (Tylenol Tab*)  650 mg PO Q4H PRN FEVER/PAIN


Amlodipine Besylate (Norvasc Tab*)  2.5 mg PO QPM GEORGE


Aspirin (Aspirin 81 Mg Chew Tab*)  81 mg PO QPM GEORGE


Atorvastatin Calcium (Lipitor*)  20 mg PO QPM GEORGE


Docusate Sodium (Colace Cap*)  100 mg PO BID GEORGE


Enoxaparin Sodium (Lovenox(*))  40 mg SUBCUT Q24H GEORGE


Sodium Chloride (Ns 0.9% 1000 Ml**)  1,000 mls @ 100 mls/hr IV PER RATE GEORGE


Ibuprofen (Motrin Tab*)  600 mg PO Q8H PRN PAIN


Tamsulosin HCl (Flomax Cap*)  0.4 mg PO QPM GEORGE


Timolol Maleate (Timoptic Ophth.Soln 0.25%)  1 drop LEFT EYE BID GEORGE


Trazodone HCl (Desyrel Tab*)  100 mg PO 2100 GEORGE





 Vital Signs - 8 hr











  04/11/19 04/11/19 04/11/19





  07:22 07:36 08:00


 


Temperature 97.8 F  


 


Pulse Rate 52  


 


Respiratory 18  





Rate   


 


Blood Pressure 109/32 102/42 





(mmHg)   


 


O2 Sat by Pulse 93  93





Oximetry   














  04/11/19





  11:40


 


Temperature 97.2 F


 


Pulse Rate 50


 


Respiratory 18





Rate 


 


Blood Pressure 125/48





(mmHg) 


 


O2 Sat by Pulse 95





Oximetry 











Oxygen Devices in Use Now: None


Appearance: Elderly male laying in bed in NAD


Eyes: No Scleral Icterus


Ears/Nose/Mouth/Throat: Mucous Membranes Moist


Neck: NL Appearance and Movements; NL JVP, Trachea Midline


Respiratory: Symmetrical Chest Expansion and Respiratory Effort, Clear to 

Auscultation


Cardiovascular: NL Sounds; No Murmurs; No JVD, RRR


Abdominal: NL Sounds; No Tenderness; No Distention


Extremities: No Edema


Skin: No Rash or Ulcers


Neurological: - - Lethargic, but easily aroused


Lines/Tubes/Other Access: Clean, Dry and Intact Peripheral IV


Nutrition: Taking PO's


Result Diagrams: 


 04/11/19 05:18





 04/11/19 05:18





Assess/Plan/Problems-Billing





Assessment: 





Mr. Dai is an 83 yo M with PMH of subarachnoid hemorrhage with subsequent 

dementia, HTN, CAD, and glaucoma; who presented to the ED with reports of a 

fever and was admitted because he met SIRS criteria.





- Patient Problems


(1) SIRS (systemic inflammatory response syndrome)


Code(s): R65.10 - SIRS OF NON-INFECTIOUS ORIGIN W/O ACUTE ORGAN DYSFUNCTION   

Comment: 


- Met criteria with fever, tachycardia, tachypnea; source of infection unclear


- Tmax 102.9 with associated tachypnea yesterday evening


- CXR clear; no evidence of UTI or cellulitis; gallbladder US negative


- Appreciate ID consult; recommends abd/pelvis CT d/t presence of  shunt


- No abx at this point; continue to monitor but will need to be 24-48 hr fever 

free prior to d/c


- Will start IVF d/t poor intake   





(2) Glaucoma


Code(s): H40.9 - UNSPECIFIED GLAUCOMA   Comment: 


- Left eye; pupils unequal which is his baseline


- May be a contributing factor to recent falls


- Continue timolol   





(3) HTN (hypertension)


Code(s): I10 - ESSENTIAL (PRIMARY) HYPERTENSION   Comment: 


- Normotensive, SBP 90-120s


- Continue amlodipine   





(4) CAD (coronary artery disease)


Code(s): I25.10 - ATHSCL HEART DISEASE OF NATIVE CORONARY ARTERY W/O ANG PCTRS 

  Comment: 


- Continue aspirin, atorvastatin   





(5) Dementia


Code(s): F03.90 - UNSPECIFIED DEMENTIA WITHOUT BEHAVIORAL DISTURBANCE   Comment

: 


- Supportive care


- Continue trazodone and supportive care   





(6) DVT prophylaxis


Comment: 


- Lovenox   





(7) DNR (do not resuscitate)


Comment: 


   


Status and Disposition: 





Inpatient for fever, unknown cause. Anticipate d/c when 24-48 hr fever free. He 

should be able to return to [place] as he is ambulating at baseline.





Attending: Julia Maldonado

## 2019-04-12 LAB
ANION GAP SERPL CALC-SCNC: 6 MMOL/L (ref 2–11)
BASOPHILS # BLD AUTO: 0 10^3/UL (ref 0–0.2)
BUN SERPL-MCNC: 9 MG/DL (ref 6–24)
BUN/CREAT SERPL: 14.3 (ref 8–20)
CALCIUM SERPL-MCNC: 8.5 MG/DL (ref 8.6–10.3)
CHLORIDE SERPL-SCNC: 104 MMOL/L (ref 101–111)
EOSINOPHIL # BLD AUTO: 0.2 10^3/UL (ref 0–0.6)
GLUCOSE SERPL-MCNC: 102 MG/DL (ref 70–100)
HCO3 SERPL-SCNC: 26 MMOL/L (ref 22–32)
HCT VFR BLD AUTO: 37 % (ref 36–46)
HGB BLD-MCNC: 12.6 G/DL (ref 14–18)
LYMPHOCYTES # BLD AUTO: 1.8 10^3/UL (ref 1–4.8)
MCH RBC QN AUTO: 29 PG (ref 27–31)
MCHC RBC AUTO-ENTMCNC: 35 G/DL (ref 31–36)
MCV RBC AUTO: 84 FL (ref 80–94)
MONOCYTES # BLD AUTO: 1 10^3/UL (ref 0–0.8)
NEUTROPHILS # BLD AUTO: 3 10^3/UL (ref 1.5–7.7)
NRBC # BLD AUTO: 0 10^3/UL
NRBC BLD QL AUTO: 0
PLATELET # BLD AUTO: 128 10^3/UL (ref 150–450)
POTASSIUM SERPL-SCNC: 3.6 MMOL/L (ref 3.5–5)
RBC # BLD AUTO: 4.32 10^6 /UL (ref 4.18–5.48)
SODIUM SERPL-SCNC: 136 MMOL/L (ref 135–145)
WBC # BLD AUTO: 6 10^3/UL (ref 3.5–10.8)

## 2019-04-12 RX ADMIN — MAGNESIUM HYDROXIDE PRN ML: 400 SUSPENSION ORAL at 20:27

## 2019-04-12 RX ADMIN — ATORVASTATIN CALCIUM SCH MG: 20 TABLET, FILM COATED ORAL at 17:16

## 2019-04-12 RX ADMIN — DOCUSATE SODIUM SCH MG: 100 CAPSULE, LIQUID FILLED ORAL at 20:27

## 2019-04-12 RX ADMIN — TIMOLOL MALEATE SCH DROP: 2.5 SOLUTION OPHTHALMIC at 20:27

## 2019-04-12 RX ADMIN — TIMOLOL MALEATE SCH DROP: 2.5 SOLUTION OPHTHALMIC at 07:43

## 2019-04-12 RX ADMIN — DOCUSATE SODIUM SCH MG: 100 CAPSULE, LIQUID FILLED ORAL at 07:43

## 2019-04-12 RX ADMIN — ASPIRIN SCH MG: 81 TABLET, CHEWABLE ORAL at 17:16

## 2019-04-12 RX ADMIN — AMLODIPINE BESYLATE SCH MG: 5 TABLET ORAL at 17:16

## 2019-04-12 RX ADMIN — ENOXAPARIN SODIUM SCH MG: 40 INJECTION SUBCUTANEOUS at 20:26

## 2019-04-12 RX ADMIN — SODIUM CHLORIDE SCH MLS/HR: 900 IRRIGANT IRRIGATION at 02:45

## 2019-04-12 RX ADMIN — TAMSULOSIN HYDROCHLORIDE SCH MG: 0.4 CAPSULE ORAL at 17:16

## 2019-04-12 RX ADMIN — TRAZODONE HYDROCHLORIDE SCH MG: 100 TABLET ORAL at 20:27

## 2019-04-12 NOTE — PN
Progress Note





- Progress Note


Date of Service: 04/12/19


SOAP: 


Subjective:


CC: Fever and AMS





HPI:


Mr. Dai is an 84 male with PMH significant for SAH s/p  shunt, HTN, CAD s/p 

CABG, GERD, HLD, BPH, and dementia; who presented to the emergency room for 

fever and AMS. He continues to be more drowsy than his baseline and is unable 

to feed him self and has not been eating. Mr Dai states that he feels ok today 

and has no complaints. Due to dementia, unclear if ROS is accurate but denies 

fever, chills, abdominal discomfort, right knee pain, or urinary symptoms. 





Objective:


 Vital Signs - 8 hr











  04/12/19 04/12/19 04/12/19





  03:25 06:49 07:10


 


Temperature 98.1 F 97.9 F 97.9 F


 


Pulse Rate 64 68 68


 


Respiratory 20 20 20





Rate   


 


Blood Pressure 137/40 149/59 149/59





(mmHg)   


 


O2 Sat by Pulse 93 92 92





Oximetry   








Physical Exam:


General: NAD, laying in bed


Neurological: Alert and oriented to person, confused


Cardiovascular: Heart rate regular


Respiratory: Lung sounds clear bilateral


Abdominal: Bowel sound +, ABD soft, non tender and non distended


Musculoskeletal: No tenderness or swelling of bilateral knees


Skin: No rashes or abnormalities seen on the exposed skin





 Microbiology











 04/09/19 17:09 Aerobic Blood Culture - Preliminary





 Blood Venous    No Growth Day 2





 Anaerobic Blood Culture - Preliminary





    No Growth Day 2


 


 04/09/19 16:19 Aerobic Blood Culture - Preliminary





 Blood Venous    No Growth Day 2





 Anaerobic Blood Culture - Preliminary





    No Growth Day 2


 


 04/09/19 22:51 Nasal Screen MRSA (PCR) - Final





 Nasal    Mrsa Not Detected











Assessment:


1. Fever with altered mental status. Afebrile since 4/10/19. Blood cultures 

with no growth to date, day 2. Differential DX: UTI, Prosthetic knee infection, 

viral illness, and  shunt infection. Negative UA. History of right prostatic 

knee, no erythema, no warmth, and no tenderness. Will differ to Hospitalists to 

discuss with wife if she would like to pursue testing the  shunt for 

infection. 


2. History of SAH with  shunt.


3. Dementia


4. CAD, s/p CABG





Plan:


Continue to hold ABX.

## 2019-04-12 NOTE — PN
Subjective


Date of Service: 04/12/19


Interval History: 





Mr. Dai is not able to provide any subjective data. His wife feels he is 

slightly more active than yesterday, but still not at his baseline. He has not 

offered any complaints to her. She is concerned about recurrent fever and his 

inability to report any symptoms, but is not interested in pursuing anything 

invasive such as tapping the shunt. 





No concerns from nursing.





Family History: Unchanged from Admission


Social History: Unchanged from Admission


Past Medical History: Unchanged from Admission





Objective


Active Medications: 





Acetaminophen (Tylenol Tab*)  650 mg PO Q4H PRN FEVER/PAIN


Amlodipine Besylate (Norvasc Tab*)  2.5 mg PO QPM GEORGE


Aspirin (Aspirin 81 Mg Chew Tab*)  81 mg PO QPM GEORGE


Atorvastatin Calcium (Lipitor*)  20 mg PO QPM GEORGE


Docusate Sodium (Colace Cap*)  100 mg PO BID GEORGE


Enoxaparin Sodium (Lovenox(*))  40 mg SUBCUT Q24H GEORGE


Ibuprofen (Motrin Tab*)  600 mg PO Q8H PRN PAIN


Magnesium Hydroxide (Milk Of Magnesia Liq*)  30 ml PO BID PRN CONSTIPATION


Polyethylene Glycol/Electrolytes (Miralax*)  17 gm PO DAILY PRN CONSTIPATION


Senna (Senokot Tab*)  1 tab PO BEDTIME PRN CONSTIPATION


Tamsulosin HCl (Flomax Cap*)  0.4 mg PO QPM GEORGE


Timolol Maleate (Timoptic Ophth.Soln 0.25%)  1 drop LEFT EYE BID GEORGE


Trazodone HCl (Desyrel Tab*)  100 mg PO 2100 GEORGE





 Vital Signs - 8 hr











  04/12/19 04/12/19 04/12/19





  07:10 09:04 11:18


 


Temperature 97.9 F  98.0 F


 


Pulse Rate 68  57


 


Respiratory 20 16 18





Rate   


 


Blood Pressure 149/59  134/49





(mmHg)   


 


O2 Sat by Pulse 92 92 97





Oximetry   














  04/12/19





  11:45


 


Temperature 98 F


 


Pulse Rate 57


 


Respiratory 18





Rate 


 


Blood Pressure 134/49





(mmHg) 


 


O2 Sat by Pulse 97





Oximetry 











Oxygen Devices in Use Now: None


Appearance: Elderly male laying in bed in NAD


Ears/Nose/Mouth/Throat: Mucous Membranes Moist


Neck: NL Appearance and Movements; NL JVP, Trachea Midline


Respiratory: Symmetrical Chest Expansion and Respiratory Effort, Clear to 

Auscultation


Cardiovascular: NL Sounds; No Murmurs; No JVD, RRR


Abdominal: NL Sounds; No Tenderness; No Distention


Extremities: No Edema


Neurological: NL Gait, - - Mostly nonverbal


Lines/Tubes/Other Access: Clean, Dry and Intact Peripheral IV


Nutrition: Taking PO's


Result Diagrams: 


 04/12/19 06:47





 04/12/19 06:47





Assess/Plan/Problems-Billing





Assessment: 





Mr. Dai is an 83 yo M with PMH of subarachnoid hemorrhage with subsequent 

dementia, HTN, CAD, and glaucoma; who presented to the ED with reports of a 

fever and was admitted because he met SIRS criteria.





- Patient Problems


(1) SIRS (systemic inflammatory response syndrome)


Code(s): R65.10 - SIRS OF NON-INFECTIOUS ORIGIN W/O ACUTE ORGAN DYSFUNCTION   

Comment: 


- Met criteria with fever, tachycardia, tachypnea; source of infection unclear, 

but likely viral


- Febrile up to 102.9 with associated tachypnea on the evening of 4/10/19


- CXR clear; no evidence of UTI or cellulitis; gallbladder US negative


- Appreciate ID consult; recommends no abx


- CT without evidence of  shunt abscess and wife does not wish to pursue 

tapping of the shunt


- Plan for d/c in the AM when he is >48 hr fever free   





(2) Glaucoma


Code(s): H40.9 - UNSPECIFIED GLAUCOMA   Comment: 


- Left eye; pupils unequal which is his baseline


- May be a contributing factor to recent falls


- Check carotid US as his vision changes are very recent


- Continue timolol   





(3) HTN (hypertension)


Code(s): I10 - ESSENTIAL (PRIMARY) HYPERTENSION   Comment: 


- Normotensive, SBP 130s


- Continue amlodipine   





(4) CAD (coronary artery disease)


Code(s): I25.10 - ATHSCL HEART DISEASE OF NATIVE CORONARY ARTERY W/O ANG PCTRS 

  Comment: 


- Continue aspirin, atorvastatin   





(5) Dementia


Code(s): F03.90 - UNSPECIFIED DEMENTIA WITHOUT BEHAVIORAL DISTURBANCE   Comment

: 


- Supportive care


- Continue trazodone and supportive care   





(6) DVT prophylaxis


Comment: 


- Lovenox   





(7) DNR (do not resuscitate)


Comment: 


   


Status and Disposition: 





Inpatient. Anticipate d/c tomorrow as long as he is afebrile.





Attending: Julia Maldonado

## 2019-04-13 VITALS — SYSTOLIC BLOOD PRESSURE: 137 MMHG | DIASTOLIC BLOOD PRESSURE: 40 MMHG

## 2019-04-13 RX ADMIN — TIMOLOL MALEATE SCH DROP: 2.5 SOLUTION OPHTHALMIC at 08:52

## 2019-04-13 RX ADMIN — DOCUSATE SODIUM SCH MG: 100 CAPSULE, LIQUID FILLED ORAL at 08:52

## 2019-04-13 RX ADMIN — MAGNESIUM HYDROXIDE PRN ML: 400 SUSPENSION ORAL at 08:52

## 2019-07-16 ENCOUNTER — HOSPITAL ENCOUNTER (EMERGENCY)
Dept: HOSPITAL 25 - ED | Age: 84
Discharge: HOME | End: 2019-07-16
Payer: COMMERCIAL

## 2019-07-16 VITALS — DIASTOLIC BLOOD PRESSURE: 76 MMHG | SYSTOLIC BLOOD PRESSURE: 166 MMHG

## 2019-07-16 DIAGNOSIS — Z88.8: ICD-10-CM

## 2019-07-16 DIAGNOSIS — Z91.81: ICD-10-CM

## 2019-07-16 DIAGNOSIS — Z95.1: ICD-10-CM

## 2019-07-16 DIAGNOSIS — F03.90: ICD-10-CM
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
## 2019-07-16 DIAGNOSIS — I25.10: ICD-10-CM

## 2019-07-16 DIAGNOSIS — M48.02: ICD-10-CM

## 2019-07-16 DIAGNOSIS — S00.83XA: ICD-10-CM

## 2019-07-16 DIAGNOSIS — S09.90XA: Primary | ICD-10-CM

## 2019-07-16 DIAGNOSIS — E78.00: ICD-10-CM

## 2019-07-16 DIAGNOSIS — I10: ICD-10-CM

## 2019-07-16 DIAGNOSIS — M47.892: ICD-10-CM

## 2019-07-16 DIAGNOSIS — K21.9: ICD-10-CM

## 2019-07-16 DIAGNOSIS — Z87.891: ICD-10-CM
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
## 2019-07-16 PROCEDURE — 96376 TX/PRO/DX INJ SAME DRUG ADON: CPT

## 2019-07-16 PROCEDURE — 72125 CT NECK SPINE W/O DYE: CPT

## 2019-07-16 PROCEDURE — 99283 EMERGENCY DEPT VISIT LOW MDM: CPT

## 2019-07-16 PROCEDURE — 96374 THER/PROPH/DIAG INJ IV PUSH: CPT

## 2019-07-16 PROCEDURE — 70450 CT HEAD/BRAIN W/O DYE: CPT

## 2019-07-16 NOTE — ED
Head Injury





- HPI Summary


HPI Summary: 


84-year-old male presents with head injury today.  Had unwitnessed fall.  Has 

history of dementia and falls a lot.  Has been acting normal.  Normally has 

tremors.  Denies any pain.  He is able to ambulate without any difficulty.  No 

vomiting.  wife states he is acting normal. Level 5 cavet due to dementia. fall 

was unwitness so unsure if LOC. 





- History Of Current Complaint


Chief Complaint: EDFall


Stated Complaint: FALL PER EMS


Time Seen by Provider: 07/16/19 15:20


Pain Intensity: 0





- Allergies/Home Medications


Allergies/Adverse Reactions: 


 Allergies











Allergy/AdvReac Type Severity Reaction Status Date / Time


 


haloperidol [From Haldol] Allergy  Unknown Verified 07/16/19 15:01





   Reaction  





   Details  














PMH/Surg Hx/FS Hx/Imm Hx


Endocrine/Hematology History: 


   Denies: Hx Diabetes


Cardiovascular History: Reports: Hx Aneurysm - brain aneurysm, Hx Angina - NONE 

SINCE 2003, Hx Coronary Artery Disease - CABG X5 2003, Hx Hypercholesterolemia, 

Hx Hypertension, Other Cardiovascular Problems/Disorders - CABG


   Denies: Hx Pacemaker/ICD


GI History: Reports: Hx Gastroesophageal Reflux Disease - WELL CONTROLLED


 History: Reports: Hx Benign Prostatic Hyperplasia, Other  Problems/

Disorders - PMhx UTI


   Denies: Hx Renal Disease


Sensory History: Reports: Hx Contacts or Glasses - GLASSES, Hx Glaucoma, Hx 

Hearing Aid - RIGHT EAR RARELY USES, Hx Hearing Problem


Opthamlomology History: Reports: Hx Contacts or Glasses - GLASSES, Hx Glaucoma


Neurological History: Reports: Hx CVA, Hx Dementia, Other Neuro Impairments/

Disorders - DEMENTIA, subarachnoid hemorrhage


Psychiatric History: Reports: Other Psychiatric Issues/Disorders - dementia





- Cancer History


Cancer Type, Location and Year: skin cancer





- Surgical History


Surgery Procedure, Year, and Place: shunt-2008- [facility].  RIGHT TKR 2006 [facility].  

CABG X5 2003 [facility].  TONSILLECTOMY AS A CHILD


Hx Anesthesia Reactions: No





- Immunization History


Date of Tetanus Vaccine: 3/2013


Date of Influenza Vaccine: 9/2016


Infectious Disease History: No


Infectious Disease History: 


   Denies: Hx of Known/Suspected MRSA, Traveled Outside the [place] in Last 30 Days





- Family History


Known Family History: Positive: Unknown - LEVEL 5, unable to obtain, Other - 

Negative: malignant hyperthermia, anesthesia reaction





- Social History


Alcohol Use: None


Hx Substance Use: No


Substance Use Type: Reports: None


Hx Tobacco Use: Yes


Smoking Status (MU): Former Smoker


Type: Cigarettes


Amount Used/How Often: 1/2 PPD X 30 YEARS


Have You Smoked in the Last Year: No





Review of Systems


Negative: Fever


Negative: Chest Pain


Negative: Shortness Of Breath


Positive: Headache


All Other Systems Reviewed And Are Negative: Yes





Physical Exam


Triage Information Reviewed: Yes


Vital Signs On Initial Exam: 


 Initial Vitals











Temp Pulse Resp BP Pulse Ox


 


 98.0 F   66   16   152/89   96 


 


 07/16/19 14:55  07/16/19 14:55  07/16/19 14:55  07/16/19 14:55  07/16/19 14:55











Vital Signs Reviewed: Yes


Appearance: Positive: Well-Appearing


Skin: Positive: Warm, Dry, Other - hematoma to scalp


Head/Face: Positive: Normal Head/Face Inspection


Eyes: Positive: Normal, EOMI, JAMES, Conjunctiva Clear


ENT: Positive: Pharynx normal, TMs normal


Neck: Positive: Other: - nontender neck


Respiratory/Lung Sounds: Positive: Clear to Auscultation, Breath Sounds Present


Cardiovascular: Positive: Normal, RRR


Abdomen Description: Positive: Nontender, Soft


Bowel Sounds: Positive: Present


Musculoskeletal: Positive: Strength/ROM Intact - moving all extremities, Other 

- good pulses


Neurological: Positive: Sensory/Motor Intact, CN Intact II-III - will not 

follow commands which is baseline.  Negative: Alert, Oriented to Person Place, 

Time


Psychiatric: Positive: Normal





Diagnostics





- Vital Signs


 Vital Signs











  Temp Pulse Resp BP Pulse Ox


 


 07/16/19 14:55  98.0 F  66  16  152/89  96














- Laboratory


Lab Statement: Any lab studies that have been ordered have been reviewed, and 

results considered in the medical decision making process.





- CT


  ** brain


CT Interpretation Completed By: Radiologist


Summary of CT Findings: IMPRESSION:  1. Right frontal scalp hematoma with an 

intact subjacent calvarium.  2. No acute intracranial abnormality.  3. 

Unchanged ventriculomegaly with a right transfrontal approach ventriculostomy 

catheter.  in place.  4. Mild chronic small vessel ischemic disease is likely.





  ** neck


CT Interpretation Completed By: Radiologist


Summary of CT Findings: IMPRESSION:  #. No CT evidence for traumatic cervical 

spine injury.  #. 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 

osteoarthritis with.  multilevel ankylosis and multilevel severe acquired 

central canal stenosis and foraminal.  stenosis as described with interval 

progression compared with the 2013 exam.





Head Injury Course/Dx


Course Of Treatment: 84-year-old male presents with head injury today.  Had 

unwitnessed fall.  Has history of dementia and falls a lot.  Has been acting 

normal.  Normally has tremors.  Denies any pain.  He is able to ambulate 

without any difficulty.  No vomiting.  wife states he is acting normal. Level 5 

cavet due to dementia. On exam has hematoma to forehead.  No apparent neuro 

deficit seen.  Is not alert and oriented.  CT brain shows hematoma.  CT neck 

normal.  Patient was given versed as patient would not sit still for the scan.  

Patient recovered from versed well.  will have return to nursing home.  Patient'

s wife understands agrees with plan.





- Diagnoses


Differential Diagnosis/HQI/PQRI: Concussion Without LOC, Hematoma, Intracranial 

Bleed


Provider Diagnoses: 


 Fall, Head injury, Hematoma








Discharge





- Sign-Out/Discharge


Documenting (check all that apply): Patient Departure


Patient Received Moderate/Deep Sedation with Procedure: No





- Discharge Plan


Condition: Good


Disposition: HOME


Patient Education Materials:  Head Injury (ED)


Referrals: 


Darion Leone MD [Primary Care Provider] - 


Additional Instructions: 


Place ice on area as needed


Take Tylenol for headache every 6 hours


Modify activities as tolerated


Follow up with primary within 5 days


Return to ED if develop any new or worsening symptoms





- Billing Disposition and Condition


Condition: GOOD


Disposition: Home

## 2021-01-26 NOTE — DS
CC:  Dr. Darion Leone; Dr. Isidoro Mojica *

 

DISCHARGE SUMMARY:

 

DATE OF ADMISSION:  04/09/19

 

DATE OF DISCHARGE:  04/13/19

 

PRIMARY CARE PROVIDER:  Dr. Darion Leone.

 

OPHTHALMOLOGIST:  Dr. Isidoro Mojica.

 

ATTENDING PHYSICIAN:  Dr. Mcgill * (dictated by Chely Ibrahim NP).

 

PRIMARY DIAGNOSIS:  Systemic inflammatory response syndrome, likely secondary 
to viral illness.

 

SECONDARY DIAGNOSES:

1.  Glaucoma.

2.  Hypertension.

3.  Coronary artery disease.

4.  Dementia.

 

STUDIES WHILE IN THE HOSPITAL:

1.  EKG on 04/09/19 shows normal sinus rhythm with a rate of 73, first-degree 
heart block, QTc 429.  No ischemic changes.

2.  Chest x-ray on 04/09/19 reads as no active cardiopulmonary disease.

3.  Brain CT on 04/09/19 reads as there is stable age-related diffuse cerebral 
volume loss and chronic microvascular ischemic change.  Stable mild prominence 
of the lateral and inferior ventricles.  No acute intracranial pathology.

4.  Chest x-ray on 04/10/19 reads as cardiomegaly.  No acute cardiopulmonary 
process evident.

5.  Gallbladder ultrasound on 04/10/19 reads as limited study.  Cholelithiasis.

6.  Chest x-ray on 04/11/19 reads as cardiomegaly.  No active cardiopulmonary 
disease.

7.  Abdomen and pelvis CT on 04/11/19 reads as there is free fluid tracking 
along the left paracolic gutter likely related to the ventriculoperitoneal 
shunting. There are no loculated fluid collections to suggest abscess.  There 
is a small calculus at the neck of the gallbladder.  There is a small 
nonobstructing calculus of the lower pole of the left kidney.  Atherosclerosis.

8.  Carotid Doppler study on 04/12/19 reads as limited study.  Atheromatous 
disease.  No hemodynamically significant stenosis of the right internal or left 
internal carotid arteries by flow velocity measurements.  This corresponds to a 
luminal diameter of less than 50% stenosis by NASCET criteria.

 

HISTORY OF PRESENT ILLNESS AND HOSPITAL COURSE:  Mr. Dai is an 84-year-old 
male with past medical history of subarachnoid hemorrhage with subsequent 
dementia; hypertension; CAD, status post CABG; glaucoma, who presented to the 
emergency room on 04/09/19 with complaints of a fever.  Please see the history 
and physical by JUAN Garcia, for a complete summary of the events 
leading up to this hospitalization.  In short, the patient resides at 
[place].  He is severely demented and unable to provide any subjective 
information himself.  According to records, the patient was noted to have a 
fever up to 102.5 according to EMS and he was noted to be 100.9 on arrival to 
the emergency room.  He also had reportedly been less active than usual and had 
a poor appetite within recent days.  In the emergency room, the patient was 
given 1 dose of ceftriaxone and fluids.  He was noted to have an elevated 
bilirubin at 1.4 and an elevated CRP at 82.  Urinalysis was unremarkable.  He 
was negative for influenza A and influenza B and lab work was essentially 
otherwise unremarkable.  The patient did meet SIRS criteria in the emergency 
room with fever, tachycardia, and tachypnea, though he did not meet sepsis 
criteria as there was no lactic acidosis and no clear source of infection. The 
patient was admitted by the hospitalist service.

 

Imaging was completed as noted above.  As the patient was unable to provide any 
subjective information, finding a source of infection was difficult.  The 
patient had multiple normal chest x-rays.  He did have an ultrasound of the 
gallbladder due to his slightly elevated bilirubin, though that was noted to be 
normal.  There was no sign of UTI or any other infection such as cellulitis.  
The patient continued to be much more lethargic than normal.  On 04/10/19, in 
the evening, the patient did spike a temp of 102.9.  This resolved with 
acetaminophen.  The patient was started on IV fluids.  Because of recurrent 
fevers without a clear source of infection, I did consult Infectious Disease 
and the patient was seen by Dr. Chapa on 04/11/19.  Dr. Chapa did not 
note any clear source of infection.  He did not feel as though his right 
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  There was some concern because the patient does 
have a  shunt and so a CT was done to assess for an intraabdominal abscess.  
Antibiotics were held.  CT scan results are noted above.  I did speak with the 
patient's wife and informed her that a definitive diagnosis for the patient's 
infection would include tapping the shunt to get a sample of the fluid.  The 
patient's wife does not wish to pursue any invasive treatment at this point due 
to the patient's advanced dementia.  The patient did not display any further 
fevers, though because of his dementia and inability to communicate, it was 
felt as though the patient should remain in the hospital for 48 hours fever-
free prior to discharge.  As of today, 04/13/19, the patient has been fever-
free for more than 48 hours.  His wife reports that he is not back to his 
baseline activity status, though is much less lethargic than he had been 
previously.  He has been able to ambulate around the unit and has displayed a 
good appetite.  Because there was no clear source of bacterial infection, his 
fever is presumed to be secondary to a viral illness, which at this point 
appears to have resolved.

 

Mr. Dai is stable for discharge today.  Vital signs are as follows:  Temp 97.5
, heart rate 59, respiratory rate 18, oxygen saturation 94% on room air, and 
blood pressure 137/40.

 

DISCHARGE MEDICATIONS:  Continued medications:

1.  Acetaminophen 500 mg p.o. at bedtime.

2.  Acetaminophen 500 mg p.o. q.4 hours p.r.n. fever or pain.

3.  Amlodipine 2.5 mg p.o. at bedtime.

4.  Aspirin 81 mg p.o. at bedtime.

5.  Atorvastatin 20 mg p.o. at bedtime.

6.  Docusate 100 mg p.o. b.i.d.

7.  Psyllium husk 0.4 g p.o. b.i.d.

8.  Tamsulosin 0.4 mg p.o. at bedtime.

9.  Timolol 0.25% one drop left eye b.i.d.

10.  Trazodone 100 mg p.o. at bedtime.

 

DISCHARGE PLAN:  Mr. Dai will be discharged back home to [place].  Activity 
will be as tolerated.  Diet will be regular as tolerated.  The patient can 
continue his usual medications and I have not made any further changes at this 
point.  I did speak at length with the patient's wife and she again does not 
want to pursue any invasive treatments or testing at this point.  He should 
follow up with his primary care provider in 4 to 7 days.  He should return to 
the emergency room or nearest hospital for any worsening of symptoms, shortness 
of breath, lightheadedness, dizziness, chest discomfort, high fever, chills, 
night sweats, loss of consciousness, or any other worrisome signs or symptoms.

 

DISCHARGE CONDITION:  Stable.

 

DISCHARGE DISPOSITION:  Home, [facility]. This is a summarized 
report of a complex medical history and hospital stay.  For further details, 
please see the entire medical record.

 

TIME SPENT:  Approximately 50 minutes was spent on this discharge.

 

____________________________________ CHELY IBRAHIM, YULIA

 

[id number]/CPS #: [id number]

ROSA ISELA [Follow-Up: _____] : a [unfilled] follow-up visit